# Patient Record
Sex: FEMALE | Race: WHITE | NOT HISPANIC OR LATINO | Employment: OTHER | URBAN - METROPOLITAN AREA
[De-identification: names, ages, dates, MRNs, and addresses within clinical notes are randomized per-mention and may not be internally consistent; named-entity substitution may affect disease eponyms.]

---

## 2023-03-06 LAB
THYROTROPIN (MIU/L) IN SER/PLAS BY DETECTION LIMIT <= 0.05 MIU/L: 1.68 MIU/L (ref 0.44–3.98)
THYROXINE (T4) FREE (NG/DL) IN SER/PLAS: 0.96 NG/DL (ref 0.78–1.48)
TRIIODOTHYRONINE (T3) FREE (PG/ML) IN SER/PLAS: 3.2 PG/ML (ref 2.3–4.2)

## 2023-06-13 LAB
THYROTROPIN (MIU/L) IN SER/PLAS BY DETECTION LIMIT <= 0.05 MIU/L: 1.58 MIU/L (ref 0.44–3.98)
THYROXINE (T4) FREE (NG/DL) IN SER/PLAS: 0.99 NG/DL (ref 0.78–1.48)
TRIIODOTHYRONINE (T3) FREE (PG/ML) IN SER/PLAS: 3.6 PG/ML (ref 2.3–4.2)

## 2023-07-07 PROBLEM — E34.9 ENDOCRINE EXOPHTHALMOS: Status: ACTIVE | Noted: 2023-07-07

## 2023-07-07 PROBLEM — F41.8 SITUATIONAL ANXIETY: Status: ACTIVE | Noted: 2023-07-07

## 2023-07-07 PROBLEM — R92.8 ABNORMAL FINDING ON MAMMOGRAPHY: Status: ACTIVE | Noted: 2023-07-07

## 2023-07-07 PROBLEM — B37.2 CANDIDAL DERMATITIS: Status: ACTIVE | Noted: 2023-07-07

## 2023-07-07 PROBLEM — H52.13 HIGH MYOPIA, BILATERAL: Status: ACTIVE | Noted: 2023-07-07

## 2023-07-07 PROBLEM — H05.20 ENDOCRINE EXOPHTHALMOS: Status: ACTIVE | Noted: 2023-07-07

## 2023-07-07 PROBLEM — R73.09 ABNORMAL BLOOD SUGAR: Status: ACTIVE | Noted: 2023-07-07

## 2023-07-07 PROBLEM — H52.203 ASTIGMATISM OF BOTH EYES: Status: ACTIVE | Noted: 2023-07-07

## 2023-07-07 PROBLEM — R94.6 THYROID FUNCTION TEST ABNORMAL: Status: ACTIVE | Noted: 2023-07-07

## 2023-07-07 PROBLEM — H52.4 BILATERAL PRESBYOPIA: Status: ACTIVE | Noted: 2023-07-07

## 2023-07-07 PROBLEM — E05.90 HYPERTHYROIDISM: Status: ACTIVE | Noted: 2023-07-07

## 2023-07-07 PROBLEM — I10 BENIGN ESSENTIAL HYPERTENSION: Status: ACTIVE | Noted: 2023-07-07

## 2023-07-07 PROBLEM — R06.02 SOB (SHORTNESS OF BREATH): Status: ACTIVE | Noted: 2023-07-07

## 2023-07-07 PROBLEM — H50.22 HYPOTROPIA OF LEFT EYE: Status: ACTIVE | Noted: 2023-07-07

## 2023-07-07 PROBLEM — Z86.69 HISTORY OF MIGRAINE HEADACHES: Status: ACTIVE | Noted: 2023-07-07

## 2023-07-07 PROBLEM — H50.89 RESTRICTIVE STRABISMUS: Status: ACTIVE | Noted: 2023-07-07

## 2023-07-07 PROBLEM — H04.203 BILATERAL EPIPHORA: Status: ACTIVE | Noted: 2023-07-07

## 2023-07-07 PROBLEM — R53.83 FATIGUE: Status: ACTIVE | Noted: 2023-07-07

## 2023-07-07 PROBLEM — E74.39 OTHER DISORDERS OF INTESTINAL CARBOHYDRATE ABSORPTION: Status: ACTIVE | Noted: 2023-07-07

## 2023-07-07 PROBLEM — M85.80 OSTEOPENIA: Status: ACTIVE | Noted: 2023-07-07

## 2023-07-07 PROBLEM — K64.9 HEMORRHOIDS: Status: ACTIVE | Noted: 2023-07-07

## 2023-07-07 PROBLEM — M75.81 ROTATOR CUFF TENDINITIS, RIGHT: Status: ACTIVE | Noted: 2023-07-07

## 2023-07-07 PROBLEM — H53.2 DIPLOPIA: Status: ACTIVE | Noted: 2023-07-07

## 2023-07-07 PROBLEM — E78.5 DYSLIPIDEMIA: Status: ACTIVE | Noted: 2023-07-07

## 2023-07-07 PROBLEM — C44.91 BASAL CELL CARCINOMA: Status: ACTIVE | Noted: 2023-07-07

## 2023-07-07 PROBLEM — H25.10 NUCLEAR SCLEROSIS: Status: ACTIVE | Noted: 2023-07-07

## 2023-07-07 PROBLEM — E05.00 GRAVES DISEASE: Status: ACTIVE | Noted: 2023-07-07

## 2023-07-07 PROBLEM — J30.9 ALLERGIC RHINITIS: Status: ACTIVE | Noted: 2023-07-07

## 2023-07-07 PROBLEM — Z91.013: Status: ACTIVE | Noted: 2023-07-07

## 2023-07-07 RX ORDER — LEVOCETIRIZINE DIHYDROCHLORIDE 5 MG/1
TABLET, FILM COATED ORAL
COMMUNITY
Start: 2023-02-02 | End: 2023-11-27 | Stop reason: SDUPTHER

## 2023-07-07 RX ORDER — LORAZEPAM 0.5 MG/1
TABLET ORAL
COMMUNITY
Start: 2022-07-26 | End: 2023-08-24 | Stop reason: SDUPTHER

## 2023-07-09 DIAGNOSIS — I10 HYPERTENSION, UNSPECIFIED TYPE: ICD-10-CM

## 2023-07-09 DIAGNOSIS — E78.5 HYPERLIPIDEMIA, UNSPECIFIED HYPERLIPIDEMIA TYPE: ICD-10-CM

## 2023-07-10 RX ORDER — ATORVASTATIN CALCIUM 40 MG/1
40 TABLET, FILM COATED ORAL DAILY
Qty: 100 TABLET | Refills: 1 | Status: SHIPPED | OUTPATIENT
Start: 2023-07-10 | End: 2023-11-27 | Stop reason: SDUPTHER

## 2023-07-10 RX ORDER — AMLODIPINE BESYLATE 10 MG/1
10 TABLET ORAL DAILY
Qty: 100 TABLET | Refills: 1 | Status: SHIPPED | OUTPATIENT
Start: 2023-07-10 | End: 2024-02-05

## 2023-08-02 PROBLEM — M79.673 HEEL PAIN: Status: RESOLVED | Noted: 2023-08-02 | Resolved: 2023-08-02

## 2023-08-02 PROBLEM — R07.9 CHEST PAIN: Status: RESOLVED | Noted: 2023-08-02 | Resolved: 2023-08-02

## 2023-08-02 PROBLEM — R05.9 COUGH: Status: RESOLVED | Noted: 2023-08-02 | Resolved: 2023-08-02

## 2023-08-02 PROBLEM — H35.373 EPIRETINAL MEMBRANE (ERM) OF BOTH EYES: Status: ACTIVE | Noted: 2023-08-02

## 2023-08-02 PROBLEM — H25.811 COMBINED FORM OF AGE-RELATED CATARACT, RIGHT EYE: Status: ACTIVE | Noted: 2023-08-02

## 2023-08-02 PROBLEM — M25.511 RIGHT SHOULDER PAIN: Status: RESOLVED | Noted: 2023-08-02 | Resolved: 2023-08-02

## 2023-08-02 PROBLEM — M25.512 LEFT SHOULDER PAIN: Status: RESOLVED | Noted: 2023-08-02 | Resolved: 2023-08-02

## 2023-08-02 PROBLEM — M75.00 FROZEN SHOULDER: Status: RESOLVED | Noted: 2023-08-02 | Resolved: 2023-08-02

## 2023-08-02 PROBLEM — H25.812 COMBINED FORM OF AGE-RELATED CATARACT, LEFT EYE: Status: RESOLVED | Noted: 2023-08-02 | Resolved: 2023-08-02

## 2023-08-02 PROBLEM — R21 GROIN RASH: Status: RESOLVED | Noted: 2023-08-02 | Resolved: 2023-08-02

## 2023-08-03 ENCOUNTER — APPOINTMENT (OUTPATIENT)
Dept: PRIMARY CARE | Facility: CLINIC | Age: 73
End: 2023-08-03
Payer: MEDICARE

## 2023-08-24 ENCOUNTER — OFFICE VISIT (OUTPATIENT)
Dept: PRIMARY CARE | Facility: CLINIC | Age: 73
End: 2023-08-24
Payer: MEDICARE

## 2023-08-24 VITALS
HEIGHT: 64 IN | BODY MASS INDEX: 26.8 KG/M2 | HEART RATE: 78 BPM | WEIGHT: 157 LBS | DIASTOLIC BLOOD PRESSURE: 80 MMHG | SYSTOLIC BLOOD PRESSURE: 144 MMHG

## 2023-08-24 DIAGNOSIS — F41.1 GENERALIZED ANXIETY DISORDER: ICD-10-CM

## 2023-08-24 DIAGNOSIS — Z00.00 ROUTINE GENERAL MEDICAL EXAMINATION AT A HEALTH CARE FACILITY: Primary | ICD-10-CM

## 2023-08-24 DIAGNOSIS — M85.88 OSTEOPENIA OF LUMBAR SPINE: ICD-10-CM

## 2023-08-24 DIAGNOSIS — I10 BENIGN ESSENTIAL HYPERTENSION: ICD-10-CM

## 2023-08-24 DIAGNOSIS — E05.00 GRAVES DISEASE: ICD-10-CM

## 2023-08-24 DIAGNOSIS — E78.5 DYSLIPIDEMIA: ICD-10-CM

## 2023-08-24 DIAGNOSIS — Z51.81 ENCOUNTER FOR THERAPEUTIC DRUG LEVEL MONITORING: ICD-10-CM

## 2023-08-24 LAB
ALANINE AMINOTRANSFERASE (SGPT) (U/L) IN SER/PLAS: 26 U/L (ref 7–45)
ALBUMIN (G/DL) IN SER/PLAS: 4.4 G/DL (ref 3.4–5)
ALKALINE PHOSPHATASE (U/L) IN SER/PLAS: 104 U/L (ref 33–136)
ANION GAP IN SER/PLAS: 16 MMOL/L (ref 10–20)
ASPARTATE AMINOTRANSFERASE (SGOT) (U/L) IN SER/PLAS: 25 U/L (ref 9–39)
BILIRUBIN TOTAL (MG/DL) IN SER/PLAS: 0.4 MG/DL (ref 0–1.2)
CALCIDIOL (25 OH VITAMIN D3) (NG/ML) IN SER/PLAS: 61 NG/ML
CALCIUM (MG/DL) IN SER/PLAS: 9.6 MG/DL (ref 8.6–10.6)
CARBON DIOXIDE, TOTAL (MMOL/L) IN SER/PLAS: 22 MMOL/L (ref 21–32)
CHLORIDE (MMOL/L) IN SER/PLAS: 108 MMOL/L (ref 98–107)
CHOLESTEROL (MG/DL) IN SER/PLAS: 157 MG/DL (ref 0–199)
CHOLESTEROL IN HDL (MG/DL) IN SER/PLAS: 42.7 MG/DL
CHOLESTEROL/HDL RATIO: 3.7
COBALAMIN (VITAMIN B12) (PG/ML) IN SER/PLAS: 391 PG/ML (ref 211–911)
CREATININE (MG/DL) IN SER/PLAS: 0.62 MG/DL (ref 0.5–1.05)
ERYTHROCYTE DISTRIBUTION WIDTH (RATIO) BY AUTOMATED COUNT: 13.5 % (ref 11.5–14.5)
ERYTHROCYTE MEAN CORPUSCULAR HEMOGLOBIN CONCENTRATION (G/DL) BY AUTOMATED: 31.3 G/DL (ref 32–36)
ERYTHROCYTE MEAN CORPUSCULAR VOLUME (FL) BY AUTOMATED COUNT: 95 FL (ref 80–100)
ERYTHROCYTES (10*6/UL) IN BLOOD BY AUTOMATED COUNT: 4.39 X10E12/L (ref 4–5.2)
GFR FEMALE: >90 ML/MIN/1.73M2
GLUCOSE (MG/DL) IN SER/PLAS: 89 MG/DL (ref 74–99)
HEMATOCRIT (%) IN BLOOD BY AUTOMATED COUNT: 41.8 % (ref 36–46)
HEMOGLOBIN (G/DL) IN BLOOD: 13.1 G/DL (ref 12–16)
LDL: 61 MG/DL (ref 0–99)
LEUKOCYTES (10*3/UL) IN BLOOD BY AUTOMATED COUNT: 10.6 X10E9/L (ref 4.4–11.3)
NON HDL CHOLESTEROL: 114 MG/DL
NRBC (PER 100 WBCS) BY AUTOMATED COUNT: 0 /100 WBC (ref 0–0)
PLATELETS (10*3/UL) IN BLOOD AUTOMATED COUNT: 347 X10E9/L (ref 150–450)
POTASSIUM (MMOL/L) IN SER/PLAS: 4 MMOL/L (ref 3.5–5.3)
PROTEIN TOTAL: 7.5 G/DL (ref 6.4–8.2)
SODIUM (MMOL/L) IN SER/PLAS: 142 MMOL/L (ref 136–145)
TRIGLYCERIDE (MG/DL) IN SER/PLAS: 269 MG/DL (ref 0–149)
UREA NITROGEN (MG/DL) IN SER/PLAS: 20 MG/DL (ref 6–23)
VLDL: 54 MG/DL (ref 0–40)

## 2023-08-24 PROCEDURE — 80061 LIPID PANEL: CPT

## 2023-08-24 PROCEDURE — 80373 DRUG SCREENING TRAMADOL: CPT

## 2023-08-24 PROCEDURE — 99214 OFFICE O/P EST MOD 30 MIN: CPT | Performed by: FAMILY MEDICINE

## 2023-08-24 PROCEDURE — 80354 DRUG SCREENING FENTANYL: CPT

## 2023-08-24 PROCEDURE — 82306 VITAMIN D 25 HYDROXY: CPT

## 2023-08-24 PROCEDURE — 80368 SEDATIVE HYPNOTICS: CPT

## 2023-08-24 PROCEDURE — 80358 DRUG SCREENING METHADONE: CPT

## 2023-08-24 PROCEDURE — 80053 COMPREHEN METABOLIC PANEL: CPT

## 2023-08-24 PROCEDURE — 1159F MED LIST DOCD IN RCRD: CPT | Performed by: FAMILY MEDICINE

## 2023-08-24 PROCEDURE — 80346 BENZODIAZEPINES1-12: CPT

## 2023-08-24 PROCEDURE — 99397 PER PM REEVAL EST PAT 65+ YR: CPT | Performed by: FAMILY MEDICINE

## 2023-08-24 PROCEDURE — 3079F DIAST BP 80-89 MM HG: CPT | Performed by: FAMILY MEDICINE

## 2023-08-24 PROCEDURE — 80307 DRUG TEST PRSMV CHEM ANLYZR: CPT

## 2023-08-24 PROCEDURE — 85027 COMPLETE CBC AUTOMATED: CPT

## 2023-08-24 PROCEDURE — 3077F SYST BP >= 140 MM HG: CPT | Performed by: FAMILY MEDICINE

## 2023-08-24 PROCEDURE — 80361 OPIATES 1 OR MORE: CPT

## 2023-08-24 PROCEDURE — 1036F TOBACCO NON-USER: CPT | Performed by: FAMILY MEDICINE

## 2023-08-24 PROCEDURE — 80365 DRUG SCREENING OXYCODONE: CPT

## 2023-08-24 PROCEDURE — 82607 VITAMIN B-12: CPT

## 2023-08-24 RX ORDER — PNEUMOCOCCAL 20-VALENT CONJUGATE VACCINE 2.2; 2.2; 2.2; 2.2; 2.2; 2.2; 2.2; 2.2; 2.2; 2.2; 2.2; 2.2; 2.2; 2.2; 2.2; 2.2; 4.4; 2.2; 2.2; 2.2 UG/.5ML; UG/.5ML; UG/.5ML; UG/.5ML; UG/.5ML; UG/.5ML; UG/.5ML; UG/.5ML; UG/.5ML; UG/.5ML; UG/.5ML; UG/.5ML; UG/.5ML; UG/.5ML; UG/.5ML; UG/.5ML; UG/.5ML; UG/.5ML; UG/.5ML; UG/.5ML
0.5 INJECTION, SUSPENSION INTRAMUSCULAR ONCE
Qty: 0.5 ML | Refills: 0 | Status: SHIPPED | OUTPATIENT
Start: 2023-08-24 | End: 2023-08-24

## 2023-08-24 RX ORDER — LORAZEPAM 0.5 MG/1
0.5 TABLET ORAL EVERY 6 HOURS PRN
Qty: 28 TABLET | Refills: 0 | Status: SHIPPED | OUTPATIENT
Start: 2023-08-24 | End: 2023-08-31

## 2023-08-24 ASSESSMENT — PATIENT HEALTH QUESTIONNAIRE - PHQ9
SUM OF ALL RESPONSES TO PHQ9 QUESTIONS 1 AND 2: 0
1. LITTLE INTEREST OR PLEASURE IN DOING THINGS: NOT AT ALL
2. FEELING DOWN, DEPRESSED OR HOPELESS: NOT AT ALL

## 2023-08-26 PROBLEM — Z00.00 ROUTINE GENERAL MEDICAL EXAMINATION AT A HEALTH CARE FACILITY: Status: ACTIVE | Noted: 2023-08-26

## 2023-08-26 PROBLEM — Z51.81 ENCOUNTER FOR THERAPEUTIC DRUG LEVEL MONITORING: Status: ACTIVE | Noted: 2023-08-26

## 2023-08-26 PROBLEM — F41.1 GENERALIZED ANXIETY DISORDER: Status: ACTIVE | Noted: 2023-08-26

## 2023-08-26 NOTE — PROGRESS NOTES
Will be done  Subjective     Patient ID: Jaz Marino is a 73 y.o. female who presents for Follow-up, Hip Pain (Left side), Toe Pain, Neck Pain, and Med Management.      Pt's PMH, PSH, SH, FH , meds and allergies was obtained / reviewed and updated .     Dental visits : Y  Vision issues : N  Hearing issues : N    Immunizations : Y    Diet :  could be better  Exercise:  Weight concerns :     Alcohol: as noted in SH  Tobacco: as noted in SH  Recreational drug use : None/ as noted in SH    Sexually active : Active   Contraception :   Menstrual problems:  Premenopausal/perimenopausal/ postmenopausal:    G:  Parity:  Full term:    Premature:   (s):   Living :  Ab induced:   Ab spontaneous :  Ectopic :   Multiple :    PAP smear :  Mammogram :  Colonoscopy:    Metabolic screening   - Lipid   - Glucose  I have personally reviewed the OAR[place] report for patient I have considered the risks of abuse, dependence, addiction and diversion.   Is the patient prescribed a combination of a benzodiazepine and opioid? No.   Last urine drug screening date/ordered   Results of last screen: Results as expected.   Controlled Substance Agreement:   I have printed this form and reviewed each line item with the patient and the patient has verbalized understanding.   Date of the last Controlled Substance Agreement:   BENZODIAZEPINES   What is the patient's goal of therapy? anxiety.  Is this being achieved with current treatment? anxiety.   REY-7   1. Feeling nervous, anxious or on edge- more than half the days  2. Not being able to stop or control worrying - more than half the days  3. Worrying too much about different things - more than half the days  4. Trouble relaxing - more than half the days  5. Being so restless that it is hard to sit still - more than half the days  6. Becoming easily annoyed or irritable - more than half the days  7. Feeling afraid as if something awful might happen - more than half the days  Total Score  "= 14  Activities of Daily Living:   Yes, it is my opinion that this patient is benefitting from benzodiazepine therapy.   Physical functioning: Better   Family relationships: Better   Social relationships: Better   Mood: Better   Sleep patterns: Better   Overall functioning: Better   Referrals or Alternatives: None, Psychiatry/Psychotherapy/ Behavioral Health: .   Current or Past Use of Non-Controlled Medication: Serotonin Reuptake Inhibitor (SSRI).   Patient is here for follow-up  Anxiety only when she is traveling  Has pain over the left iliac crest that is episodic  Blood pressure well controlled      ======================================================    Visit Vitals  Blood Pressure 144/80   Pulse 78   Height 1.626 m (5' 4\")   Weight 71.2 kg (157 lb)   Body Mass Index 26.95 kg/m²   Smoking Status Never   Body Surface Area 1.79 m²      No LMP recorded.     =====================================    Review of systems:  Constitutional: no chills, no fever and no night sweats.     Eyes: no blurred vision and no eyesight problems.     ENT: no hearing loss, no nasal congestion, no nasal discharge, no hoarseness and no sore throat.     Cardiovascular: no chest pain, no intermittent leg claudication, no lower extremity edema, no palpitations and no syncope.     Respiratory: no cough, no shortness of breath during exertion, no shortness of breath at rest and no wheezing.     Gastrointestinal: no abdominal pain, no blood in stools, no constipation, no diarrhea, no melena, no nausea, no rectal pain and no vomiting.     Genitourinary: no dysuria, no change in urinary frequency, no urinary hesitancy, no feelings of urinary urgency and no vaginal discharge.     Musculoskeletal: no arthralgias, no back pain and no myalgias.     Integumentary: no new skin lesions and no rashes.     Neurological: no difficulty walking, no headache, no limb weakness, no numbness and no tingling.     Psychiatric: no anxiety, no depression, no " anhedonia and no substance use disorders.   ============================================================    Physical exam :    Constitutional: Alert and in no acute distress. Well developed, well nourished.     Eyes: Normal external exam. Pupils were equal in size, round, reactive to light (PERRL) with normal accommodation and extraocular movements intact (EOMI).     Ears, Nose, Mouth, and Throat: External inspection of ears and nose: Normal.  Otoscopic examination: Normal.      Neck: No neck mass was observed. Supple.     Cardiovascular: Heart rate and rhythm were normal, normal S1 and S2, no gallops, no murmurs and no pericardial rub    Pulmonary: No respiratory distress. Clear bilateral breath sounds.     Abdomen: Soft nontender; no abdominal mass palpated. No organomegaly.     Musculoskeletal: No joint swelling seen, normal movements of all extremities. Range of motion: Normal.  Muscle strength/tone: Normal.      Skin: Normal skin color and pigmentation, normal skin turgor, and no rash.     Neurologic: Deep tendon reflexes were 2+ and symmetric. Sensation: Normal.     Psychiatric: Judgment and insight: Intact. Mood and affect: Normal.    Lymphatic : Cervical/ axillary/ groin Lns Palpable/ non palpable     Endocrine: no recent weight gain and no recent weight loss.     Hematologic/Lymphatic: no tendency for easy bruising and no swollen glands.          All other systems have been reviewed and are negative for complaint.      Assessment/Plan    Problem List Items Addressed This Visit       Benign essential hypertension    Dyslipidemia    Relevant Orders    Lipid Panel (Completed)    Comprehensive Metabolic Panel (Completed)    CBC (Completed)    Vitamin B12 (Completed)    Graves disease    Relevant Orders    CBC (Completed)    Vitamin B12 (Completed)    Osteopenia    Relevant Orders    Vitamin D, Total (Completed)    CBC (Completed)    Vitamin B12 (Completed)    Routine general medical examination at a health [facility]  facility - Primary    Encounter for therapeutic drug level monitoring    Relevant Orders    Opiate/Opioid/Benzo Extended Prescription Compliance    Generalized anxiety disorder    Relevant Medications    LORazepam (Ativan) 0.5 mg tablet    Other Relevant Orders    Opiate/Opioid/Benzo Extended Prescription Compliance

## 2023-08-30 LAB
6-ACETYLMORPHINE: <25 NG/ML
7-AMINOCLONAZEPAM: <25 NG/ML
ALPHA-HYDROXYALPRAZOLAM: <25 NG/ML
ALPHA-HYDROXYMIDAZOLAM: <25 NG/ML
ALPRAZOLAM: <25 NG/ML
AMPHETAMINE (PRESENCE) IN URINE BY SCREEN METHOD: NORMAL
BARBITURATES PRESENCE IN URINE BY SCREEN METHOD: NORMAL
CANNABINOIDS IN URINE BY SCREEN METHOD: NORMAL
CHLORDIAZEPOXIDE: <25 NG/ML
CLONAZEPAM: <25 NG/ML
COCAINE (PRESENCE) IN URINE BY SCREEN METHOD: NORMAL
CODEINE: <50 NG/ML
CREATINE, URINE FOR DRUG: 112.9 MG/DL
DIAZEPAM: <25 NG/ML
DRUG SCREEN COMMENT URINE: NORMAL
EDDP: <25 NG/ML
FENTANYL CONFIRMATION, URINE: <2.5 NG/ML
HYDROCODONE: <25 NG/ML
HYDROMORPHONE: <25 NG/ML
LORAZEPAM: <25 NG/ML
METHADONE CONFIRMATION,URINE: <25 NG/ML
MIDAZOLAM: <25 NG/ML
MORPHINE URINE: <50 NG/ML
NORDIAZEPAM: <25 NG/ML
NORFENTANYL: <2.5 NG/ML
NORHYDROCODONE: <25 NG/ML
NOROXYCODONE: <25 NG/ML
O-DESMETHYLTRAMADOL: <50 NG/ML
OXAZEPAM: <25 NG/ML
OXYCODONE: <25 NG/ML
OXYMORPHONE: <25 NG/ML
PHENCYCLIDINE (PRESENCE) IN URINE BY SCREEN METHOD: NORMAL
TEMAZEPAM: <25 NG/ML
TRAMADOL: <50 NG/ML
ZOLPIDEM METABOLITE (ZCA): <25 NG/ML
ZOLPIDEM: <25 NG/ML

## 2023-10-15 DIAGNOSIS — I10 PRIMARY HYPERTENSION: ICD-10-CM

## 2023-10-16 RX ORDER — LOSARTAN POTASSIUM AND HYDROCHLOROTHIAZIDE 25; 100 MG/1; MG/1
1 TABLET ORAL DAILY
Qty: 100 TABLET | Refills: 1 | Status: SHIPPED | OUTPATIENT
Start: 2023-10-16 | End: 2023-11-27 | Stop reason: SDUPTHER

## 2023-11-10 ENCOUNTER — OFFICE VISIT (OUTPATIENT)
Dept: PRIMARY CARE | Facility: CLINIC | Age: 73
End: 2023-11-10
Payer: MEDICARE

## 2023-11-10 VITALS
HEART RATE: 81 BPM | SYSTOLIC BLOOD PRESSURE: 120 MMHG | DIASTOLIC BLOOD PRESSURE: 69 MMHG | HEIGHT: 64 IN | BODY MASS INDEX: 26.46 KG/M2 | WEIGHT: 155 LBS

## 2023-11-10 DIAGNOSIS — Z12.31 VISIT FOR SCREENING MAMMOGRAM: ICD-10-CM

## 2023-11-10 DIAGNOSIS — H92.01 EAR PAIN, RIGHT: Primary | ICD-10-CM

## 2023-11-10 DIAGNOSIS — E05.00 GRAVES DISEASE: ICD-10-CM

## 2023-11-10 PROBLEM — H35.342 LAMELLAR MACULAR HOLE OF LEFT EYE: Status: ACTIVE | Noted: 2023-11-10

## 2023-11-10 PROCEDURE — 99214 OFFICE O/P EST MOD 30 MIN: CPT | Performed by: FAMILY MEDICINE

## 2023-11-10 PROCEDURE — 1159F MED LIST DOCD IN RCRD: CPT | Performed by: FAMILY MEDICINE

## 2023-11-10 PROCEDURE — 1036F TOBACCO NON-USER: CPT | Performed by: FAMILY MEDICINE

## 2023-11-10 PROCEDURE — 3074F SYST BP LT 130 MM HG: CPT | Performed by: FAMILY MEDICINE

## 2023-11-10 PROCEDURE — 3078F DIAST BP <80 MM HG: CPT | Performed by: FAMILY MEDICINE

## 2023-11-10 RX ORDER — AMOXICILLIN AND CLAVULANATE POTASSIUM 875; 125 MG/1; MG/1
875 TABLET, FILM COATED ORAL 2 TIMES DAILY
Qty: 14 TABLET | Refills: 0 | Status: SHIPPED | OUTPATIENT
Start: 2023-11-10 | End: 2023-11-17

## 2023-11-10 ASSESSMENT — ENCOUNTER SYMPTOMS
OCCASIONAL FEELINGS OF UNSTEADINESS: 0
DEPRESSION: 0
LOSS OF SENSATION IN FEET: 0

## 2023-11-11 NOTE — PROGRESS NOTES
1. Continue lamictal from 50mg daily.  2. Pomodoro technique  3.   These more severe symptoms can include:    fever  joint pain  muscle pain  general discomfort  swelling of the lymph nodes around the neck  high count of eosinophils (a type of immune cell) in the blood    Follow up in 2 weeks   Chief Complaint/HPI:  Patient is here for acute visit has been having pain in the ear had dental work done last week denies any fever has had sinus congestion has been taking antihistamine denies cough  Needs mammogram order    ROS otherwise negative aside from what was mentioned above in HPI.      Patient Active Problem List   Diagnosis    Abnormal blood sugar    Abnormal finding on mammography    Allergic rhinitis    Allergy to clam    Astigmatism of both eyes    Basal cell carcinoma    Benign essential hypertension    Bilateral epiphora    Bilateral presbyopia    Candidal dermatitis    Diplopia    Dyslipidemia    Graves disease    Endocrine exophthalmos    Fatigue    Hemorrhoids    High myopia, bilateral    History of migraine headaches    Hyperthyroidism    Hypotropia of left eye    Nuclear sclerosis    Osteopenia    Other disorders of intestinal carbohydrate absorption    Restrictive strabismus    Rotator cuff tendinitis, right    Situational anxiety    SOB (shortness of breath)    Thyroid function test abnormal    Combined form of age-related cataract, right eye    Epiretinal membrane (ERM) of both eyes    Routine general medical examination at a health care facility    Encounter for therapeutic drug level monitoring    Generalized anxiety disorder    Lamellar macular hole of left eye    Visit for screening mammogram    Ear pain, right     Past Medical History:   Diagnosis Date    Acute gastric ulcer with hemorrhage     Acute gastric ulcer with hemorrhage    Chest pain 08/02/2023    Cough 08/02/2023    Frozen shoulder 08/02/2023    Left shoulder pain 08/02/2023    Other conditions influencing health status     Skin Cancer     Past Surgical History:   Procedure Laterality Date    HEMORRHOID SURGERY  04/20/2013    Hemorrhoidectomy    SKIN CANCER EXCISION  04/20/2013    Mohs Micrographic Surgery Face     Family History   Problem Relation Name Age of Onset    Other (dyslipidemia) Mother      Hypertension Mother       Other (dyslipidemia) Father      Stroke Father      Skin cancer Other       Social History     Tobacco Use    Smoking status: Never    Smokeless tobacco: Never   Substance Use Topics    Alcohol use: Never    Drug use: Never         ALLERGIES  Allergies   Allergen Reactions    Codeine Other    Pneumococcal Vaccine Other    Propoxyphene N-Acetaminophen Other         MEDICATIONS  Current Outpatient Medications   Medication Sig Dispense Refill    amLODIPine (Norvasc) 10 mg tablet Take 1 tablet (10 mg) by mouth once daily. 100 tablet 1    amoxicillin-pot clavulanate (Augmentin) 875-125 mg tablet Take 1 tablet (875 mg) by mouth 2 times a day for 7 days. 14 tablet 0    atorvastatin (Lipitor) 40 mg tablet Take 1 tablet (40 mg) by mouth once daily. 100 tablet 1    levocetirizine (Xyzal) 5 mg tablet TAKE 1 TABLET DAILY IN THE EVENING.      LORazepam (Ativan) 0.5 mg tablet Take 1 tablet (0.5 mg) by mouth every 6 hours if needed for anxiety for up to 7 days. 28 tablet 0    losartan-hydrochlorothiazide (Hyzaar) 100-25 mg tablet TAKE 1 TABLET BY MOUTH ONCE  DAILY 100 tablet 1     No current facility-administered medications for this visit.         PHYSICAL EXAM  Visit Vitals  Blood Pressure 120/69   Pulse 81     .FLOWAMB[11   .FLOWAMB[14   Body mass index is 26.61 kg/m².  Gen: Alert, NAD  HEENT:  PERRLA, EOMI, conjunctiva and sclera normal in appearance  Respiratory:  Lungs CTAB  Cardiovascular:  Heart RRR. No M/R/G  Neuro:  Gross motor and sensory intact  Skin:  No suspicious lesions present    ASSESSMENT/PLAN  Problem List Items Addressed This Visit       Graves disease    Visit for screening mammogram    Relevant Orders    BI mammo bilateral screening tomosynthesis    Ear pain, right - Primary    Relevant Medications    amoxicillin-pot clavulanate (Augmentin) 875-125 mg tablet           Edy Whatley MD

## 2023-11-27 ENCOUNTER — LAB (OUTPATIENT)
Dept: LAB | Facility: LAB | Age: 73
End: 2023-11-27
Payer: MEDICARE

## 2023-11-27 DIAGNOSIS — E78.5 HYPERLIPIDEMIA, UNSPECIFIED HYPERLIPIDEMIA TYPE: ICD-10-CM

## 2023-11-27 DIAGNOSIS — E05.00 THYROTOXICOSIS WITH DIFFUSE GOITER WITHOUT THYROTOXIC CRISIS OR STORM: Primary | ICD-10-CM

## 2023-11-27 DIAGNOSIS — I10 PRIMARY HYPERTENSION: ICD-10-CM

## 2023-11-27 DIAGNOSIS — E05.00 THYROTOXICOSIS WITH DIFFUSE GOITER WITHOUT THYROTOXIC CRISIS OR STORM: ICD-10-CM

## 2023-11-27 DIAGNOSIS — J30.9 ALLERGIC RHINITIS, UNSPECIFIED SEASONALITY, UNSPECIFIED TRIGGER: ICD-10-CM

## 2023-11-27 LAB
T3FREE SERPL-MCNC: 3.5 PG/ML (ref 2.3–4.2)
T4 FREE SERPL-MCNC: 0.93 NG/DL (ref 0.78–1.48)
TSH SERPL-ACNC: 1.38 MIU/L (ref 0.44–3.98)

## 2023-11-27 PROCEDURE — 36415 COLL VENOUS BLD VENIPUNCTURE: CPT

## 2023-11-27 PROCEDURE — 84481 FREE ASSAY (FT-3): CPT

## 2023-11-27 PROCEDURE — 84439 ASSAY OF FREE THYROXINE: CPT

## 2023-11-27 PROCEDURE — 84443 ASSAY THYROID STIM HORMONE: CPT

## 2023-11-27 RX ORDER — LOSARTAN POTASSIUM AND HYDROCHLOROTHIAZIDE 25; 100 MG/1; MG/1
1 TABLET ORAL DAILY
Qty: 100 TABLET | Refills: 1 | Status: SHIPPED | OUTPATIENT
Start: 2023-11-27 | End: 2024-02-29 | Stop reason: SDUPTHER

## 2023-11-27 RX ORDER — LEVOCETIRIZINE DIHYDROCHLORIDE 5 MG/1
5 TABLET, FILM COATED ORAL EVERY EVENING
Qty: 90 TABLET | Refills: 1 | Status: SHIPPED | OUTPATIENT
Start: 2023-11-27

## 2023-11-27 RX ORDER — ATORVASTATIN CALCIUM 40 MG/1
40 TABLET, FILM COATED ORAL DAILY
Qty: 100 TABLET | Refills: 1 | Status: SHIPPED | OUTPATIENT
Start: 2023-11-27 | End: 2024-05-01

## 2023-11-28 ENCOUNTER — TELEPHONE (OUTPATIENT)
Dept: PRIMARY CARE | Facility: CLINIC | Age: 73
End: 2023-11-28
Payer: MEDICARE

## 2023-12-01 ENCOUNTER — OFFICE VISIT (OUTPATIENT)
Dept: ENDOCRINOLOGY | Facility: CLINIC | Age: 73
End: 2023-12-01
Payer: MEDICARE

## 2023-12-01 VITALS
WEIGHT: 154.4 LBS | DIASTOLIC BLOOD PRESSURE: 70 MMHG | RESPIRATION RATE: 16 BRPM | HEART RATE: 76 BPM | HEIGHT: 64 IN | SYSTOLIC BLOOD PRESSURE: 120 MMHG | BODY MASS INDEX: 26.36 KG/M2

## 2023-12-01 DIAGNOSIS — E05.00 GRAVES DISEASE: Primary | ICD-10-CM

## 2023-12-01 PROCEDURE — 1036F TOBACCO NON-USER: CPT | Performed by: INTERNAL MEDICINE

## 2023-12-01 PROCEDURE — 3074F SYST BP LT 130 MM HG: CPT | Performed by: INTERNAL MEDICINE

## 2023-12-01 PROCEDURE — 1159F MED LIST DOCD IN RCRD: CPT | Performed by: INTERNAL MEDICINE

## 2023-12-01 PROCEDURE — 3078F DIAST BP <80 MM HG: CPT | Performed by: INTERNAL MEDICINE

## 2023-12-01 PROCEDURE — 99213 OFFICE O/P EST LOW 20 MIN: CPT | Performed by: INTERNAL MEDICINE

## 2023-12-01 ASSESSMENT — ENCOUNTER SYMPTOMS
COUGH: 0
FATIGUE: 0
CHILLS: 0
HEADACHES: 0
PALPITATIONS: 0
NAUSEA: 0
VOMITING: 0
DIARRHEA: 0
SHORTNESS OF BREATH: 0
FEVER: 0

## 2023-12-01 NOTE — PROGRESS NOTES
"Subjective   Patient ID: Jaz Marino is a 73 y.o. female who presents for Graves' Disease and Hyperthyroidism.  HPI  Since last visit has continued to do well of methimazole  On it from 6325-1747 for graves with active eye dz.   Eye dz resolved and now off methimazole for a year.   Still dealing with other eye issues and may need cataract procedure.      Review of Systems   Constitutional:  Negative for chills, fatigue and fever.   Respiratory:  Negative for cough and shortness of breath.    Cardiovascular:  Negative for chest pain and palpitations.   Gastrointestinal:  Negative for diarrhea, nausea and vomiting.   Neurological:  Negative for headaches.       Objective       12/1/2023 11:40 AM    /70   Pulse 76   Resp 16   Weight 70 kg (154 lb 6.4 oz)   Height 1.626 m (5' 4\")       Physical Exam  Constitutional:       Appearance: Normal appearance. She is overweight.   HENT:      Head: Normocephalic and atraumatic.   Neck:      Thyroid: No thyroid mass, thyromegaly or thyroid tenderness.   Cardiovascular:      Rate and Rhythm: Normal rate and regular rhythm.      Heart sounds: No murmur heard.     No gallop.   Pulmonary:      Effort: Pulmonary effort is normal.      Breath sounds: Normal breath sounds.   Abdominal:      Palpations: Abdomen is soft.      Comments: benign   Neurological:      General: No focal deficit present.      Mental Status: She is alert and oriented to person, place, and time.      Deep Tendon Reflexes: Reflexes are normal and symmetric.   Psychiatric:         Behavior: Behavior is cooperative.         Assessment/Plan   Problem List Items Addressed This Visit             ICD-10-CM    Graves disease - Primary E05.00     Discussed her course.   Discussed recurrence after a year off medication happens but is fairly rare.  Will follow up in 9 months.  Encouraged her to call or message with concerns     "

## 2024-02-04 DIAGNOSIS — I10 HYPERTENSION, UNSPECIFIED TYPE: ICD-10-CM

## 2024-02-05 RX ORDER — AMLODIPINE BESYLATE 10 MG/1
10 TABLET ORAL DAILY
Qty: 100 TABLET | Refills: 1 | Status: SHIPPED | OUTPATIENT
Start: 2024-02-05

## 2024-02-29 ENCOUNTER — OFFICE VISIT (OUTPATIENT)
Dept: PRIMARY CARE | Facility: CLINIC | Age: 74
End: 2024-02-29
Payer: MEDICARE

## 2024-02-29 VITALS
HEART RATE: 65 BPM | DIASTOLIC BLOOD PRESSURE: 71 MMHG | BODY MASS INDEX: 26.8 KG/M2 | WEIGHT: 157 LBS | SYSTOLIC BLOOD PRESSURE: 125 MMHG | HEIGHT: 64 IN

## 2024-02-29 DIAGNOSIS — I10 PRIMARY HYPERTENSION: ICD-10-CM

## 2024-02-29 DIAGNOSIS — E05.00 GRAVES DISEASE: ICD-10-CM

## 2024-02-29 DIAGNOSIS — Z00.00 ROUTINE GENERAL MEDICAL EXAMINATION AT HEALTH CARE FACILITY: Primary | ICD-10-CM

## 2024-02-29 DIAGNOSIS — E78.5 DYSLIPIDEMIA: ICD-10-CM

## 2024-02-29 DIAGNOSIS — I10 BENIGN ESSENTIAL HYPERTENSION: ICD-10-CM

## 2024-02-29 DIAGNOSIS — C44.319 BASAL CELL CARCINOMA (BCC) OF SKIN OF OTHER PART OF FACE: ICD-10-CM

## 2024-02-29 PROCEDURE — 3078F DIAST BP <80 MM HG: CPT | Performed by: FAMILY MEDICINE

## 2024-02-29 PROCEDURE — 1036F TOBACCO NON-USER: CPT | Performed by: FAMILY MEDICINE

## 2024-02-29 PROCEDURE — 99214 OFFICE O/P EST MOD 30 MIN: CPT | Performed by: FAMILY MEDICINE

## 2024-02-29 PROCEDURE — 1159F MED LIST DOCD IN RCRD: CPT | Performed by: FAMILY MEDICINE

## 2024-02-29 PROCEDURE — 1124F ACP DISCUSS-NO DSCNMKR DOCD: CPT | Performed by: FAMILY MEDICINE

## 2024-02-29 PROCEDURE — 3074F SYST BP LT 130 MM HG: CPT | Performed by: FAMILY MEDICINE

## 2024-02-29 PROCEDURE — 1170F FXNL STATUS ASSESSED: CPT | Performed by: FAMILY MEDICINE

## 2024-02-29 PROCEDURE — 1160F RVW MEDS BY RX/DR IN RCRD: CPT | Performed by: FAMILY MEDICINE

## 2024-02-29 PROCEDURE — G0444 DEPRESSION SCREEN ANNUAL: HCPCS | Performed by: FAMILY MEDICINE

## 2024-02-29 PROCEDURE — G0439 PPPS, SUBSEQ VISIT: HCPCS | Performed by: FAMILY MEDICINE

## 2024-02-29 RX ORDER — LOSARTAN POTASSIUM AND HYDROCHLOROTHIAZIDE 25; 100 MG/1; MG/1
1 TABLET ORAL DAILY
Qty: 90 TABLET | Refills: 1 | Status: SHIPPED | OUTPATIENT
Start: 2024-02-29 | End: 2024-05-08

## 2024-02-29 ASSESSMENT — ACTIVITIES OF DAILY LIVING (ADL)
TAKING_MEDICATION: INDEPENDENT
DOING_HOUSEWORK: INDEPENDENT
MANAGING_FINANCES: INDEPENDENT
GROCERY_SHOPPING: INDEPENDENT
BATHING: INDEPENDENT
DRESSING: INDEPENDENT

## 2024-02-29 ASSESSMENT — PATIENT HEALTH QUESTIONNAIRE - PHQ9
2. FEELING DOWN, DEPRESSED OR HOPELESS: NOT AT ALL
SUM OF ALL RESPONSES TO PHQ9 QUESTIONS 1 AND 2: 0
1. LITTLE INTEREST OR PLEASURE IN DOING THINGS: NOT AT ALL

## 2024-02-29 ASSESSMENT — ENCOUNTER SYMPTOMS
LOSS OF SENSATION IN FEET: 0
DEPRESSION: 0
OCCASIONAL FEELINGS OF UNSTEADINESS: 0

## 2024-02-29 NOTE — PROGRESS NOTES
"Subjective   Reason for Visit: Jaz Marino is an 73 y.o. female here for a Medicare Wellness visit.     Past Medical, Surgical, and Family History reviewed and updated in chart.         HPI  HTN bp controlled  HLD taking and bethany statin  Hyperthyroid stable sees endo  Lost brother in September has been grieving    Patient Care Team:  Edy Whatley MD as PCP - General     Review of Systems   Constitutional:  Negative for chills and fever.   HENT: Negative.     Eyes:  Positive for visual disturbance.   Respiratory: Negative.     Cardiovascular: Negative.    Gastrointestinal: Negative.  Negative for nausea and vomiting.   Endocrine: Negative.    Genitourinary: Negative.    Musculoskeletal: Negative.    Skin: Negative.  Negative for rash.   Allergic/Immunologic: Negative.    Neurological: Negative.    Hematological: Negative.    Psychiatric/Behavioral: Negative.     All other systems reviewed and are negative.      Objective   Vitals:  Blood Pressure 125/71   Pulse 65   Height 1.626 m (5' 4\")   Weight 71.2 kg (157 lb)   Body Mass Index 26.95 kg/m²       Physical Exam  Vitals and nursing note reviewed.   Constitutional:       General: She is not in acute distress.     Appearance: Normal appearance.   HENT:      Head: Normocephalic and atraumatic.      Right Ear: Tympanic membrane normal.      Left Ear: Tympanic membrane normal.      Nose: Nose normal.      Mouth/Throat:      Mouth: Mucous membranes are moist.   Eyes:      Extraocular Movements: Extraocular movements intact.      Pupils: Pupils are equal, round, and reactive to light.   Cardiovascular:      Rate and Rhythm: Normal rate and regular rhythm.      Pulses: Normal pulses.      Heart sounds: Normal heart sounds.   Pulmonary:      Effort: Pulmonary effort is normal.      Breath sounds: Normal breath sounds.   Abdominal:      General: Abdomen is flat. Bowel sounds are normal.      Palpations: Abdomen is soft.   Musculoskeletal:         General: Normal " "range of motion.      Cervical back: Normal range of motion and neck supple. No rigidity.   Skin:     General: Skin is warm and dry.      Capillary Refill: Capillary refill takes less than 2 seconds.      Findings: No rash.   Neurological:      General: No focal deficit present.      Mental Status: She is alert and oriented to person, place, and time.   Psychiatric:         Mood and Affect: Mood normal.         Thought Content: Thought content normal.         Assessment/Plan   Problem List Items Addressed This Visit       Basal cell carcinoma    Current Assessment & Plan     S/p excision         Benign essential hypertension    Current Assessment & Plan     Discussed modifiable ( diet , exercise, weight ) and non modifiable ( Age, family history) risk factors HTN .  Encouraged to stay physically active , healthy eating habits - limit red meat intake, more of plant based diet, freshly prepared foods etc.,    Adverse effects of untreated hypertension discussed which include CAD/MI, CVA ,  CKD etc.,           Dyslipidemia    Current Assessment & Plan     Check lipid panel continue medications continue healthy eating work out  150 minutes a week          Relevant Orders    Comprehensive Metabolic Panel    Lipid Panel    Graves disease    Current Assessment & Plan     Has been off meds. Sees ENDO.         Routine general medical examination at health care facility - Primary    Primary hypertension    Relevant Medications    losartan-hydrochlorothiazide (Hyzaar) 100-25 mg tablet    spent 15 minutes obtaining and discussing depression screening using PHQ-2 questions with results documented in chart.”  (If screen positive: \"The screen indicated potential depression and PHQ-9 was obtained with treatment and referral plan discussed         "

## 2024-03-03 PROBLEM — I10 PRIMARY HYPERTENSION: Status: ACTIVE | Noted: 2024-03-03

## 2024-03-03 PROBLEM — Z00.00 ROUTINE GENERAL MEDICAL EXAMINATION AT HEALTH CARE FACILITY: Status: ACTIVE | Noted: 2024-03-03

## 2024-03-03 ASSESSMENT — ENCOUNTER SYMPTOMS
CARDIOVASCULAR NEGATIVE: 1
VOMITING: 0
MUSCULOSKELETAL NEGATIVE: 1
CHILLS: 0
ENDOCRINE NEGATIVE: 1
PSYCHIATRIC NEGATIVE: 1
NAUSEA: 0
FEVER: 0
NEUROLOGICAL NEGATIVE: 1
HEMATOLOGIC/LYMPHATIC NEGATIVE: 1
RESPIRATORY NEGATIVE: 1
GASTROINTESTINAL NEGATIVE: 1
ALLERGIC/IMMUNOLOGIC NEGATIVE: 1

## 2024-03-03 NOTE — ASSESSMENT & PLAN NOTE
Discussed modifiable ( diet , exercise, weight ) and non modifiable ( Age, family history) risk factors HTN .  Encouraged to stay physically active , healthy eating habits - limit red meat intake, more of plant based diet, freshly prepared foods etc.,    Adverse effects of untreated hypertension discussed which include CAD/MI, CVA ,  CKD etc.,

## 2024-03-07 ENCOUNTER — LAB (OUTPATIENT)
Dept: LAB | Facility: LAB | Age: 74
End: 2024-03-07
Payer: MEDICARE

## 2024-03-07 DIAGNOSIS — E78.5 DYSLIPIDEMIA: ICD-10-CM

## 2024-03-07 LAB
ALBUMIN SERPL BCP-MCNC: 4.3 G/DL (ref 3.4–5)
ALP SERPL-CCNC: 88 U/L (ref 33–136)
ALT SERPL W P-5'-P-CCNC: 23 U/L (ref 7–45)
ANION GAP SERPL CALC-SCNC: 13 MMOL/L (ref 10–20)
AST SERPL W P-5'-P-CCNC: 22 U/L (ref 9–39)
BILIRUB SERPL-MCNC: 0.5 MG/DL (ref 0–1.2)
BUN SERPL-MCNC: 22 MG/DL (ref 6–23)
CALCIUM SERPL-MCNC: 9.4 MG/DL (ref 8.6–10.6)
CHLORIDE SERPL-SCNC: 104 MMOL/L (ref 98–107)
CHOLEST SERPL-MCNC: 171 MG/DL (ref 0–199)
CHOLESTEROL/HDL RATIO: 4.2
CO2 SERPL-SCNC: 26 MMOL/L (ref 21–32)
CREAT SERPL-MCNC: 0.69 MG/DL (ref 0.5–1.05)
EGFRCR SERPLBLD CKD-EPI 2021: >90 ML/MIN/1.73M*2
GLUCOSE SERPL-MCNC: 91 MG/DL (ref 74–99)
HDLC SERPL-MCNC: 41 MG/DL
LDLC SERPL CALC-MCNC: 59 MG/DL
NON HDL CHOLESTEROL: 130 MG/DL (ref 0–149)
POTASSIUM SERPL-SCNC: 3.7 MMOL/L (ref 3.5–5.3)
PROT SERPL-MCNC: 7.5 G/DL (ref 6.4–8.2)
SODIUM SERPL-SCNC: 139 MMOL/L (ref 136–145)
TRIGL SERPL-MCNC: 355 MG/DL (ref 0–149)
VLDL: 71 MG/DL (ref 0–40)

## 2024-03-07 PROCEDURE — 36415 COLL VENOUS BLD VENIPUNCTURE: CPT

## 2024-03-07 PROCEDURE — 80061 LIPID PANEL: CPT

## 2024-03-07 PROCEDURE — 80053 COMPREHEN METABOLIC PANEL: CPT

## 2024-03-11 ENCOUNTER — OFFICE VISIT (OUTPATIENT)
Dept: OPHTHALMOLOGY | Facility: CLINIC | Age: 74
End: 2024-03-11
Payer: MEDICARE

## 2024-03-11 DIAGNOSIS — H35.373 EPIRETINAL MEMBRANE (ERM) OF BOTH EYES: Primary | ICD-10-CM

## 2024-03-11 DIAGNOSIS — H35.342 LAMELLAR MACULAR HOLE OF LEFT EYE: ICD-10-CM

## 2024-03-11 PROCEDURE — 99214 OFFICE O/P EST MOD 30 MIN: CPT | Performed by: OPHTHALMOLOGY

## 2024-03-11 PROCEDURE — 92134 CPTRZ OPH DX IMG PST SGM RTA: CPT | Performed by: OPHTHALMOLOGY

## 2024-03-11 ASSESSMENT — ENCOUNTER SYMPTOMS
RESPIRATORY NEGATIVE: 0
NEUROLOGICAL NEGATIVE: 0
HEMATOLOGIC/LYMPHATIC NEGATIVE: 0
ALLERGIC/IMMUNOLOGIC NEGATIVE: 0
CARDIOVASCULAR NEGATIVE: 0
EYES NEGATIVE: 1
MUSCULOSKELETAL NEGATIVE: 0
CONSTITUTIONAL NEGATIVE: 0
ENDOCRINE NEGATIVE: 0
PSYCHIATRIC NEGATIVE: 0
GASTROINTESTINAL NEGATIVE: 0

## 2024-03-11 ASSESSMENT — TONOMETRY
OS_IOP_MMHG: 15
IOP_METHOD: GOLDMANN APPLANATION
OD_IOP_MMHG: 16

## 2024-03-11 ASSESSMENT — VISUAL ACUITY
OS_CC+: -1
CORRECTION_TYPE: GLASSES
OD_CC+: -2
OS_CC: 20/30
OD_CC: 20/25
METHOD: SNELLEN - LINEAR

## 2024-03-11 ASSESSMENT — CUP TO DISC RATIO
OD_RATIO: 0.4
OS_RATIO: 0.4

## 2024-03-11 ASSESSMENT — EXTERNAL EXAM - LEFT EYE: OS_EXAM: NORMAL

## 2024-03-11 ASSESSMENT — EXTERNAL EXAM - RIGHT EYE: OD_EXAM: NORMAL

## 2024-03-11 ASSESSMENT — SLIT LAMP EXAM - LIDS
COMMENTS: NORMAL
COMMENTS: NORMAL

## 2024-03-11 NOTE — PROGRESS NOTES
Impression          1 H35.373 Epiretinal membrane (erm) of both eyes-Worsening     2 H35.342 Lamellar macular hole of left eye-New     3 H25.811 Combined form of age-related cataract, right eye-Worsening     4 H25.812 Combined form of age-related cataract, left eye-Worsening     5 H04.203 Bilateral epiphora-Stable     6 H50.89 Restrictive strabismus-Improving     7 H50.22 Hypotropia of left eye-Improving     8 E05.00 Thyroid eye disease-Improving     9 H52.10 Myopia-Stable     10 H52.209 Astigmatism-Stable     11 H52.4 Presbyopia-Stable                         Discussion      lamellar hole OS that is not needing any intervention created by:Isaac Smith               Plan             1 yr fu              TODAY   (OU)   - OCT Macula By:Isaac Smith     1 Myopic retinopathy OU     no retinal teras or detachments          2 Epiretinal membrane OU no need for intervention     3 Thyroid disease TSh normal under endocrinology                    Hi quality OCT  scans obtained     signal good          OCT OD - Normal Foveal Contour, No Edema, IS/OS Junction Normal     OCT OS - Normal Foveal Contour, No Edema, IS/OS Junction Normal          additional commnents:myopic

## 2024-04-10 ENCOUNTER — OFFICE VISIT (OUTPATIENT)
Dept: OPHTHALMOLOGY | Facility: CLINIC | Age: 74
End: 2024-04-10
Payer: MEDICARE

## 2024-04-10 DIAGNOSIS — H50.89 RESTRICTIVE STRABISMUS: ICD-10-CM

## 2024-04-10 DIAGNOSIS — H04.203 BILATERAL EPIPHORA: ICD-10-CM

## 2024-04-10 DIAGNOSIS — H52.203 ASTIGMATISM OF BOTH EYES, UNSPECIFIED TYPE: ICD-10-CM

## 2024-04-10 DIAGNOSIS — H50.22 HYPOTROPIA OF LEFT EYE: ICD-10-CM

## 2024-04-10 DIAGNOSIS — H52.4 PRESBYOPIA: ICD-10-CM

## 2024-04-10 DIAGNOSIS — H35.342 LAMELLAR MACULAR HOLE OF LEFT EYE: ICD-10-CM

## 2024-04-10 DIAGNOSIS — H25.812 COMBINED FORM OF AGE-RELATED CATARACT, LEFT EYE: ICD-10-CM

## 2024-04-10 DIAGNOSIS — H52.13 MYOPIA OF BOTH EYES: ICD-10-CM

## 2024-04-10 DIAGNOSIS — E07.9 THYROID EYE DISEASE: ICD-10-CM

## 2024-04-10 DIAGNOSIS — H57.89 THYROID EYE DISEASE: ICD-10-CM

## 2024-04-10 DIAGNOSIS — H35.373 EPIRETINAL MEMBRANE (ERM) OF BOTH EYES: ICD-10-CM

## 2024-04-10 DIAGNOSIS — H25.811 COMBINED FORM OF AGE-RELATED CATARACT, RIGHT EYE: Primary | ICD-10-CM

## 2024-04-10 PROCEDURE — 1159F MED LIST DOCD IN RCRD: CPT | Performed by: OPHTHALMOLOGY

## 2024-04-10 PROCEDURE — 99214 OFFICE O/P EST MOD 30 MIN: CPT | Performed by: OPHTHALMOLOGY

## 2024-04-10 PROCEDURE — 1160F RVW MEDS BY RX/DR IN RCRD: CPT | Performed by: OPHTHALMOLOGY

## 2024-04-10 PROCEDURE — 1123F ACP DISCUSS/DSCN MKR DOCD: CPT | Performed by: OPHTHALMOLOGY

## 2024-04-10 RX ORDER — PHENYLEPHRINE HYDROCHLORIDE 100 MG/ML
1 SOLUTION/ DROPS OPHTHALMIC
Status: CANCELLED | OUTPATIENT
Start: 2024-04-10 | End: 2024-04-10

## 2024-04-10 RX ORDER — CYCLOPENTOLATE HYDROCHLORIDE 10 MG/ML
1 SOLUTION/ DROPS OPHTHALMIC
Status: CANCELLED | OUTPATIENT
Start: 2024-04-10 | End: 2024-04-10

## 2024-04-10 RX ORDER — TETRACAINE HYDROCHLORIDE 5 MG/ML
1 SOLUTION OPHTHALMIC ONCE
Status: CANCELLED | OUTPATIENT
Start: 2024-04-10 | End: 2024-04-10

## 2024-04-10 ASSESSMENT — ENCOUNTER SYMPTOMS
HEMATOLOGIC/LYMPHATIC NEGATIVE: 0
RESPIRATORY NEGATIVE: 0
PSYCHIATRIC NEGATIVE: 0
ALLERGIC/IMMUNOLOGIC NEGATIVE: 0
CARDIOVASCULAR NEGATIVE: 0
CONSTITUTIONAL NEGATIVE: 0
EYES NEGATIVE: 1
MUSCULOSKELETAL NEGATIVE: 0
ENDOCRINE NEGATIVE: 0
GASTROINTESTINAL NEGATIVE: 0
NEUROLOGICAL NEGATIVE: 0

## 2024-04-10 ASSESSMENT — TONOMETRY
OS_IOP_MMHG: 16
OD_IOP_MMHG: 15
IOP_METHOD: TONOPEN

## 2024-04-10 ASSESSMENT — REFRACTION_WEARINGRX
OS_ADD: +2.50
OS_CYLINDER: -1.50
OS_AXIS: 120
OD_AXIS: 165
OS_SPHERE: -4.25
OD_SPHERE: -5.75
OD_CYLINDER: -0.25
OD_ADD: +2.50

## 2024-04-10 ASSESSMENT — CONF VISUAL FIELD
OS_SUPERIOR_NASAL_RESTRICTION: 0
OD_INFERIOR_TEMPORAL_RESTRICTION: 0
OD_INFERIOR_NASAL_RESTRICTION: 0
OD_SUPERIOR_TEMPORAL_RESTRICTION: 0
OD_SUPERIOR_NASAL_RESTRICTION: 0
OD_NORMAL: 1
OS_INFERIOR_TEMPORAL_RESTRICTION: 0
OS_INFERIOR_NASAL_RESTRICTION: 0
OS_NORMAL: 1
OS_SUPERIOR_TEMPORAL_RESTRICTION: 0

## 2024-04-10 ASSESSMENT — REFRACTION_MANIFEST
OS_CYLINDER: -1.75
OD_CYLINDER: -0.25
OS_SPHERE: -4.25
OS_ADD: +2.50
OD_SPHERE: -6.25
OD_ADD: +2.50
OS_AXIS: 120
OD_AXIS: 165

## 2024-04-10 ASSESSMENT — VISUAL ACUITY
CORRECTION_TYPE: GLASSES
OS_BAT_MED: 20/50
METHOD: SNELLEN - LINEAR
OS_CC: 20/30
OD_CC+: +1
OS_CC+: -1
OD_BAT_MED: 20/40
OD_CC: 20/30

## 2024-04-10 ASSESSMENT — CUP TO DISC RATIO
OS_RATIO: .3
OD_RATIO: .3

## 2024-04-10 ASSESSMENT — EXTERNAL EXAM - LEFT EYE: OS_EXAM: NORMAL

## 2024-04-10 ASSESSMENT — EXTERNAL EXAM - RIGHT EYE: OD_EXAM: NORMAL

## 2024-04-10 NOTE — PROGRESS NOTES
Combined form of age-related cataract, right eyeH25.811  Combined form of age-related cataract, left eyeH25.812  -Visually significant cataract left eye. BCVA: 20/25-. Glare: 20/50(M). Symptoms: Visually significant cataracts OU.  Symptoms: Gradual worsening of vision x months. Harder to read small print. More difficulty seeing small words/numbers on TV. Having more trouble seeing road signs when driving. Glare from bright sun. A change in glasses prescription will not result in significant visual improvement at this time.  Indication/anticipated outcome for cataract surgery: To potentially improve visual acuity and improve quality of life/reduce symptoms. To obtain a better view of the retina/optic nerve. To reduce anisometropia).  Based on a comprehensive eye exam performed 4/10/24, a visually significant cataract appears to be the source of decreased vision, diminished quality of life, and impairment of activities of daily living. Discussed option of cataract surgery vs observation. Patient can no longer function adequately with current best corrected visual acuity and wishes to have cataract surgery at this time. Discussed surgical procedure with patient. Discussed potential risks, benefits, and complications of cataract surgery including but not limited to pain, bleeding, infection, inflammation, edema, increased eye pressure, retinal tear/detachment, lens dislocation, ptosis, iris damage, need for additional surgery, need for glasses after surgery, loss of vision/loss of eye. Patient understands and wishes to proceed. All questions were answered. Will schedule cataract surgery left eye. Will evaluate other eye following cataract surgery left eye. Lenstar done 7/19/23.   -Pentacam (7/19/2023) - Irregular.  43.0/43.3 @ 119.2.  OS: Regular oblique.  42.9/43.7 @ 45.5.  Discussed IOL options (standard monofocal, toric, multifocal). Lens chosen: Standard monofocal. Defer/decline toric/multifocal lens at this  time.  Aim: Pl to -0.50. Had thorough discussion with patient re: aim. Discussed that may potentially need glasses for best vision both at distance and at near. Patient states she keeps her glasses on all day and does not take off for near work.   Dominance: right eye  Special considerations: Guarded visual prognosis due to macular pathology. Also, will have anisometropia and may feel off balance following CEIOL OS and before CEIOL OD. No R.   Best contact number: [phone] (home - answering machine)  Drops:   -Ketorolac (or Diclofenac) and Ofloxacin 4x/day starting 1 day prior to surgery; Prednisolone acetate 1% 4x/day starting after surgery    Epiretinal membrane (ERM) of both eyesH35.373  Lamellar hole, left eye  -OCT macula (7/19/23) - SS: 8/10 OD 9/10 OS.  OD: Normal thickness, mild flattening of foveal contour, mild ERM.  Intact IS-OS.  No edema.  OS: Normal thickness, ERM with early lamellar hole/pseudocyst.  Intact IS-OS.  No edema.  261/253.  -Patient saw Dr. Smith 3/11/24 - recommended observation. Guarded visual prognosis with cataract surgery due to macular pathology.    Bilateral [id number]  -May use artificial tears PRN. May consider referral to oculoplastic surgeon for evaluation as needed in the future.     Restrictive jaryuakwbbB91.89  Hypotropia of left eyeH50.22  Thyroid eye boqvpafG79.00  -RUPERTO s/p LIR recession 7.0mm and LMR recession 5.0mm on 10/14/2022 ([place])   -F/u with Dr. Reina PRN.  May consider prisms as needed in the future if symptomatic diplopia following cataract surgery.    TcisxoG41.10  RqzwkfdydvjG68.209  FbfipharvbM20.4  -Current glasses October 2022  -History of RGP wear from age 17 until late 40s. No recent CLW.   -Defer new Rx. No significant improvement in vision with refraction at this time.   -Requests progressives post surgery (scar on nose)      No history of intraocular surgery/refractive surgery.   No FH of glaucoma  (+)FH AMD - older brother (exudative,  receives antiVEGF)

## 2024-05-01 DIAGNOSIS — E78.5 HYPERLIPIDEMIA, UNSPECIFIED HYPERLIPIDEMIA TYPE: ICD-10-CM

## 2024-05-01 RX ORDER — ATORVASTATIN CALCIUM 40 MG/1
40 TABLET, FILM COATED ORAL DAILY
Qty: 100 TABLET | Refills: 2 | Status: SHIPPED | OUTPATIENT
Start: 2024-05-01 | End: 2024-06-04 | Stop reason: SDUPTHER

## 2024-05-08 DIAGNOSIS — I10 PRIMARY HYPERTENSION: ICD-10-CM

## 2024-05-08 RX ORDER — LOSARTAN POTASSIUM AND HYDROCHLOROTHIAZIDE 25; 100 MG/1; MG/1
1 TABLET ORAL DAILY
Qty: 100 TABLET | Refills: 1 | Status: SHIPPED | OUTPATIENT
Start: 2024-05-08 | End: 2024-06-04 | Stop reason: SDUPTHER

## 2024-05-17 DIAGNOSIS — H25.812 COMBINED FORM OF AGE-RELATED CATARACT, LEFT EYE: ICD-10-CM

## 2024-05-17 DIAGNOSIS — H25.811 COMBINED FORM OF AGE-RELATED CATARACT, RIGHT EYE: Primary | ICD-10-CM

## 2024-05-17 RX ORDER — KETOROLAC TROMETHAMINE 5 MG/ML
SOLUTION OPHTHALMIC
Qty: 5 ML | Refills: 2 | Status: SHIPPED | OUTPATIENT
Start: 2024-05-17

## 2024-05-17 RX ORDER — PREDNISOLONE ACETATE 10 MG/ML
SUSPENSION/ DROPS OPHTHALMIC
Qty: 5 ML | Refills: 2 | Status: SHIPPED | OUTPATIENT
Start: 2024-05-17

## 2024-05-17 RX ORDER — OFLOXACIN 3 MG/ML
SOLUTION/ DROPS OPHTHALMIC
Qty: 5 ML | Refills: 2 | Status: SHIPPED | OUTPATIENT
Start: 2024-05-17

## 2024-05-29 ENCOUNTER — ANESTHESIA EVENT (OUTPATIENT)
Dept: OPERATING ROOM | Facility: CLINIC | Age: 74
End: 2024-05-29
Payer: MEDICARE

## 2024-05-30 ENCOUNTER — HOSPITAL ENCOUNTER (OUTPATIENT)
Facility: CLINIC | Age: 74
Setting detail: OUTPATIENT SURGERY
Discharge: HOME | End: 2024-05-30
Attending: OPHTHALMOLOGY | Admitting: OPHTHALMOLOGY
Payer: MEDICARE

## 2024-05-30 ENCOUNTER — ANESTHESIA (OUTPATIENT)
Dept: OPERATING ROOM | Facility: CLINIC | Age: 74
End: 2024-05-30
Payer: MEDICARE

## 2024-05-30 VITALS
TEMPERATURE: 98.1 F | HEIGHT: 65 IN | WEIGHT: 155.42 LBS | BODY MASS INDEX: 25.9 KG/M2 | SYSTOLIC BLOOD PRESSURE: 137 MMHG | DIASTOLIC BLOOD PRESSURE: 63 MMHG | RESPIRATION RATE: 16 BRPM | OXYGEN SATURATION: 97 % | HEART RATE: 61 BPM

## 2024-05-30 DIAGNOSIS — H25.812 COMBINED FORM OF AGE-RELATED CATARACT, LEFT EYE: ICD-10-CM

## 2024-05-30 DIAGNOSIS — H25.811 COMBINED FORM OF AGE-RELATED CATARACT, RIGHT EYE: Primary | ICD-10-CM

## 2024-05-30 PROBLEM — R11.2 PONV (POSTOPERATIVE NAUSEA AND VOMITING): Status: ACTIVE | Noted: 2024-05-30

## 2024-05-30 PROBLEM — Z98.890 PONV (POSTOPERATIVE NAUSEA AND VOMITING): Status: ACTIVE | Noted: 2024-05-30

## 2024-05-30 PROCEDURE — 66984 XCAPSL CTRC RMVL W/O ECP: CPT | Performed by: OPHTHALMOLOGY

## 2024-05-30 PROCEDURE — 2500000004 HC RX 250 GENERAL PHARMACY W/ HCPCS (ALT 636 FOR OP/ED): Performed by: NURSE ANESTHETIST, CERTIFIED REGISTERED

## 2024-05-30 PROCEDURE — C1780 LENS, INTRAOCULAR (NEW TECH): HCPCS | Performed by: OPHTHALMOLOGY

## 2024-05-30 PROCEDURE — 3600000008 HC OR TIME - EACH INCREMENTAL 1 MINUTE - PROCEDURE LEVEL THREE: Performed by: OPHTHALMOLOGY

## 2024-05-30 PROCEDURE — 3600000003 HC OR TIME - INITIAL BASE CHARGE - PROCEDURE LEVEL THREE: Performed by: OPHTHALMOLOGY

## 2024-05-30 PROCEDURE — 3700000002 HC GENERAL ANESTHESIA TIME - EACH INCREMENTAL 1 MINUTE: Performed by: OPHTHALMOLOGY

## 2024-05-30 PROCEDURE — 7100000010 HC PHASE TWO TIME - EACH INCREMENTAL 1 MINUTE: Performed by: OPHTHALMOLOGY

## 2024-05-30 PROCEDURE — 2500000005 HC RX 250 GENERAL PHARMACY W/O HCPCS: Performed by: OPHTHALMOLOGY

## 2024-05-30 PROCEDURE — 7100000009 HC PHASE TWO TIME - INITIAL BASE CHARGE: Performed by: OPHTHALMOLOGY

## 2024-05-30 PROCEDURE — 2500000004 HC RX 250 GENERAL PHARMACY W/ HCPCS (ALT 636 FOR OP/ED): Performed by: OPHTHALMOLOGY

## 2024-05-30 PROCEDURE — 2500000001 HC RX 250 WO HCPCS SELF ADMINISTERED DRUGS (ALT 637 FOR MEDICARE OP): Performed by: OPHTHALMOLOGY

## 2024-05-30 PROCEDURE — 3700000001 HC GENERAL ANESTHESIA TIME - INITIAL BASE CHARGE: Performed by: OPHTHALMOLOGY

## 2024-05-30 RX ORDER — LIDOCAINE IN NACL,ISO-OSMOT/PF 30 MG/3 ML
0.1 SYRINGE (ML) INJECTION ONCE
Status: DISCONTINUED | OUTPATIENT
Start: 2024-05-30 | End: 2024-05-30 | Stop reason: HOSPADM

## 2024-05-30 RX ORDER — MIDAZOLAM HYDROCHLORIDE 1 MG/ML
INJECTION, SOLUTION INTRAMUSCULAR; INTRAVENOUS AS NEEDED
Status: DISCONTINUED | OUTPATIENT
Start: 2024-05-30 | End: 2024-05-30

## 2024-05-30 RX ORDER — POVIDONE-IODINE 5 %
SOLUTION, NON-ORAL OPHTHALMIC (EYE) AS NEEDED
Status: DISCONTINUED | OUTPATIENT
Start: 2024-05-30 | End: 2024-05-30 | Stop reason: HOSPADM

## 2024-05-30 RX ORDER — PHENYLEPHRINE HYDROCHLORIDE 100 MG/ML
1 SOLUTION/ DROPS OPHTHALMIC
Status: COMPLETED | OUTPATIENT
Start: 2024-05-30 | End: 2024-05-30

## 2024-05-30 RX ORDER — TETRACAINE HYDROCHLORIDE 5 MG/ML
1 SOLUTION OPHTHALMIC ONCE
Status: COMPLETED | OUTPATIENT
Start: 2024-05-30 | End: 2024-05-30

## 2024-05-30 RX ORDER — ACETAMINOPHEN 325 MG/1
650 TABLET ORAL EVERY 4 HOURS PRN
Status: DISCONTINUED | OUTPATIENT
Start: 2024-05-30 | End: 2024-05-30 | Stop reason: HOSPADM

## 2024-05-30 RX ORDER — EPINEPHRINE 1 MG/ML
INJECTION, SOLUTION, CONCENTRATE INTRAVENOUS AS NEEDED
Status: DISCONTINUED | OUTPATIENT
Start: 2024-05-30 | End: 2024-05-30 | Stop reason: HOSPADM

## 2024-05-30 RX ORDER — SODIUM CHLORIDE, SODIUM LACTATE, POTASSIUM CHLORIDE, CALCIUM CHLORIDE 600; 310; 30; 20 MG/100ML; MG/100ML; MG/100ML; MG/100ML
INJECTION, SOLUTION INTRAVENOUS CONTINUOUS PRN
Status: DISCONTINUED | OUTPATIENT
Start: 2024-05-30 | End: 2024-05-30

## 2024-05-30 RX ORDER — APREPITANT 40 MG/1
40 CAPSULE ORAL DAILY
Status: CANCELLED | OUTPATIENT
Start: 2024-05-30

## 2024-05-30 RX ORDER — FENTANYL CITRATE 50 UG/ML
INJECTION, SOLUTION INTRAMUSCULAR; INTRAVENOUS AS NEEDED
Status: DISCONTINUED | OUTPATIENT
Start: 2024-05-30 | End: 2024-05-30

## 2024-05-30 RX ORDER — TETRACAINE HYDROCHLORIDE 5 MG/ML
SOLUTION OPHTHALMIC AS NEEDED
Status: DISCONTINUED | OUTPATIENT
Start: 2024-05-30 | End: 2024-05-30 | Stop reason: HOSPADM

## 2024-05-30 RX ORDER — SODIUM CHLORIDE, SODIUM LACTATE, POTASSIUM CHLORIDE, CALCIUM CHLORIDE 600; 310; 30; 20 MG/100ML; MG/100ML; MG/100ML; MG/100ML
100 INJECTION, SOLUTION INTRAVENOUS CONTINUOUS
Status: DISCONTINUED | OUTPATIENT
Start: 2024-05-30 | End: 2024-05-30 | Stop reason: HOSPADM

## 2024-05-30 RX ORDER — CYCLOPENTOLATE HYDROCHLORIDE 10 MG/ML
1 SOLUTION/ DROPS OPHTHALMIC
Status: COMPLETED | OUTPATIENT
Start: 2024-05-30 | End: 2024-05-30

## 2024-05-30 RX ORDER — LIDOCAINE HYDROCHLORIDE 10 MG/ML
INJECTION, SOLUTION EPIDURAL; INFILTRATION; INTRACAUDAL; PERINEURAL AS NEEDED
Status: DISCONTINUED | OUTPATIENT
Start: 2024-05-30 | End: 2024-05-30 | Stop reason: HOSPADM

## 2024-05-30 RX ADMIN — PHENYLEPHRINE HYDROCHLORIDE 1 DROP: 100 SOLUTION/ DROPS OPHTHALMIC at 11:44

## 2024-05-30 RX ADMIN — MIDAZOLAM 1 MG: 1 INJECTION INTRAMUSCULAR; INTRAVENOUS at 12:21

## 2024-05-30 RX ADMIN — CYCLOPENTOLATE HYDROCHLORIDE 1 DROP: 10 SOLUTION/ DROPS OPHTHALMIC at 11:39

## 2024-05-30 RX ADMIN — CYCLOPENTOLATE HYDROCHLORIDE 1 DROP: 10 SOLUTION/ DROPS OPHTHALMIC at 11:34

## 2024-05-30 RX ADMIN — MIDAZOLAM 1 MG: 1 INJECTION INTRAMUSCULAR; INTRAVENOUS at 12:16

## 2024-05-30 RX ADMIN — FENTANYL CITRATE 50 MCG: 50 INJECTION, SOLUTION INTRAMUSCULAR; INTRAVENOUS at 12:23

## 2024-05-30 RX ADMIN — TETRACAINE HYDROCHLORIDE 1 DROP: 5 SOLUTION OPHTHALMIC at 11:33

## 2024-05-30 RX ADMIN — PHENYLEPHRINE HYDROCHLORIDE 1 DROP: 100 SOLUTION/ DROPS OPHTHALMIC at 11:34

## 2024-05-30 RX ADMIN — ACETAMINOPHEN 650 MG: 325 TABLET ORAL at 12:50

## 2024-05-30 RX ADMIN — CYCLOPENTOLATE HYDROCHLORIDE 1 DROP: 10 SOLUTION/ DROPS OPHTHALMIC at 11:44

## 2024-05-30 RX ADMIN — PHENYLEPHRINE HYDROCHLORIDE 1 DROP: 100 SOLUTION/ DROPS OPHTHALMIC at 11:39

## 2024-05-30 RX ADMIN — SODIUM CHLORIDE, SODIUM LACTATE, POTASSIUM CHLORIDE, AND CALCIUM CHLORIDE: .6; .31; .03; .02 INJECTION, SOLUTION INTRAVENOUS at 11:53

## 2024-05-30 ASSESSMENT — PAIN DESCRIPTION - ORIENTATION: ORIENTATION: LEFT

## 2024-05-30 ASSESSMENT — COLUMBIA-SUICIDE SEVERITY RATING SCALE - C-SSRS
1. IN THE PAST MONTH, HAVE YOU WISHED YOU WERE DEAD OR WISHED YOU COULD GO TO SLEEP AND NOT WAKE UP?: NO
6. HAVE YOU EVER DONE ANYTHING, STARTED TO DO ANYTHING, OR PREPARED TO DO ANYTHING TO END YOUR LIFE?: NO
2. HAVE YOU ACTUALLY HAD ANY THOUGHTS OF KILLING YOURSELF?: NO

## 2024-05-30 ASSESSMENT — PAIN - FUNCTIONAL ASSESSMENT
PAIN_FUNCTIONAL_ASSESSMENT: 0-10

## 2024-05-30 ASSESSMENT — PAIN SCALES - GENERAL
PAINLEVEL_OUTOF10: 3
PAINLEVEL_OUTOF10: 2
PAINLEVEL_OUTOF10: 0 - NO PAIN

## 2024-05-30 ASSESSMENT — PAIN DESCRIPTION - LOCATION: LOCATION: EYE

## 2024-05-30 ASSESSMENT — ENCOUNTER SYMPTOMS
PSYCHIATRIC NEGATIVE: 1
CARDIOVASCULAR NEGATIVE: 1
CONSTITUTIONAL NEGATIVE: 1
GASTROINTESTINAL NEGATIVE: 1
RESPIRATORY NEGATIVE: 1

## 2024-05-30 NOTE — H&P
History Of Present Illness  Jaz Marino is a 74 y.o. female presenting with left eye cataract. Here for left eye cataract extraction and intraocular lens placement.     Past Medical History  Past Medical History:   Diagnosis Date    Acute gastric ulcer with hemorrhage     Acute gastric ulcer with hemorrhage    Anxiety     Cataract     Chest pain 08/02/2023    Cough 08/02/2023    Disease of thyroid gland     Frozen shoulder 08/02/2023    Left shoulder pain 08/02/2023    Other conditions influencing health status     Skin Cancer    PONV (postoperative nausea and vomiting)        Surgical History  Past Surgical History:   Procedure Laterality Date    BLEPHAROPLASTY Left     EYE SURGERY      HEMORRHOID SURGERY  04/20/2013    Hemorrhoidectomy    SKIN CANCER EXCISION  04/20/2013    Mohs Micrographic Surgery Face        Social History  She reports that she has never smoked. She has never been exposed to tobacco smoke. She has never used smokeless tobacco. She reports that she does not drink alcohol and does not use drugs.    Family History  Family History   Problem Relation Name Age of Onset    Other (dyslipidemia) Mother      Hypertension Mother      Other (dyslipidemia) Father      Stroke Father      Skin cancer Other          Allergies  Codeine, Pneumococcal vaccine, and Propoxyphene n-acetaminophen    Review of Systems   Constitutional: Negative.    Respiratory: Negative.     Cardiovascular: Negative.    Gastrointestinal: Negative.    Psychiatric/Behavioral: Negative.          Physical Exam  Constitutional:       Appearance: Normal appearance.   HENT:      Head: Normocephalic and atraumatic.   Cardiovascular:      Rate and Rhythm: Regular rhythm.   Pulmonary:      Effort: Pulmonary effort is normal.   Abdominal:      General: Abdomen is flat.      Palpations: Abdomen is soft.   Neurological:      Mental Status: She is alert.          Last Recorded Vitals  Blood pressure 139/70, pulse 73, temperature 37.1 °C  "(98.8 °F), temperature source Temporal, resp. rate 16, height 1.651 m (5' 5\"), weight 70.5 kg (155 lb 6.8 oz), SpO2 95%.    Relevant Results        left eye cataract. Here for left eye cataract extraction and intraocular lens placement.     Assessment/Plan   Principal Problem:    Combined form of age-related cataract, left eye      left eye cataract. Here for left eye cataract extraction and intraocular lens placement.           Nanette Munoz MD    "

## 2024-05-30 NOTE — OP NOTE
Phacoemulsification Cataract with Insertion Intraocular Lens (L) Operative Note     Date: 2024  [place] [facility] [place] [facility] [place]    Name: Jaz Marino, : 1950, Age: 74 y.o., MRN: [id number], Sex: female    Diagnosis  Pre-op Diagnosis     * Combined form of age-related cataract, left eye [H25.812] Post-op Diagnosis     * Combined form of age-related cataract, left eye [H25.812]     Procedures  Phacoemulsification Cataract with Insertion Intraocular Lens  11143 - AR XCAPSL CTRC RMVL INSJ IO LENS PROSTH W/O ECP      Surgeons      * April Vann - Primary    Resident/Fellow/Other Assistant:  Surgeons and Role:  * No surgeons found with a matching role *    Procedure Summary  Anesthesia: Monitor Anesthesia Care  ASA: III  Anesthesia Staff: Anesthesiologist: Robert Loaiza DO  CRNA: MIHAI HendricksCRNA; MARISELA Okeefe  Estimated Blood Loss: 0 mL  Intra-op Medications:   Administrations occurring from 1157 to 1247 on 24:   Medication Name Total Dose   lidocaine PF (Xylocaine) 10 mg/mL (1 %) injection 1 mL   balanced salts (BSS) intraocular solution 515 mL   povidone-iodine 5 % ophthalmic solution 1 Application   tetracaine (PF) 0.5 % ophthalmic solution 2 drop   EPINEPHrine HCl (PF) (Adrenalin) injection 0.3 mg   chondroitin sulf-sod hyaluron (Duovisc) intraocular kit 1 mL              Anesthesia Record               Intraprocedure I/O Totals          Intake    LR infusion 50.00 mL    Total Intake 50 mL          Specimen: No specimens collected     Staff:   Zuleimaulator: Beatriz Fitzgerald Person: Linda         Drains and/or Catheters: * None in log *    Tourniquet Times:         Implants:  Implants       Type Name Action Serial No.       6.0MM X 13MM TECNIS EYHANCE INTRAOCULAR LENS, 1-PIECE, 12.5 DIOPTER, W/TECNIS SIMPLICITY DELIVERY SYSTEM, BICONVEX, UV-BLOCKING HYDROPHOBIC ACRYLIC Implanted [id number]              Findings: as below    Indications: Jaz Marino is an 74  y.o. female who is having surgery for Combined form of age-related cataract, left eye [H25.812].     Procedure Details: Patient name: Jaz Marino   YOB: 1950   MRN: [id number]   Date of surgery: May 30, 2024  Preoperative diagnosis: Combined cataract of the LEFT eye  Postoperative diagnosis: Combined cataract of the LEFT eye  Procedure: Phacoemulsification of cataract with insertion of intraocular lens, LEFT eye   Surgeon: April Vann MD  Resident: Nanette Munoz MD  Anesthesia: MAC  Complications: None  Lens Inserted: Cosme & Cosme DIB00 with a power of +12.50 D. Serial #: [id number]. Exp date: 06/11/2026.    Procedure description: After the risks, benefits, and alternatives of the planned procedure were discussed with the patient, informed consent was obtained at the preoperative evaluation. On the day of surgery, there were no updates to the consent form and any patient questions were answered. The patient was correctly identified in the preoperative area and the LEFT eye was marked as the operative eye. Dilating drops were instilled into the LEFT eye in the preoperative area. The patient was then taken back to the operating room and placed under sedation. The patient was prepped and draped in the standard, sterile ophthalmic fashion in preparation for intraocular surgery.    A lid speculum was placed into the LEFT palpebral fissure and the operating microscope was brought into position. A paracentesis was made in inferotemporal cornea at the limbus with a 1.0mm side port blade using a cotton tipped applicator to provide countertraction. Intracameral lidocaine was injected into the anterior chamber followed by Viscoat viscoelastic. Using 0.12 forceps to stabilize the globe, a 2.4mm keratome blade was used to create a limbal clear-corneal incision superotemporally. A bent-needle cystotome and Utrata forceps were used to create a continuous curvilinear capsulorrhexis. Balanced salt  saline solution on a blunt-tipped cannula was used to achieve hydrodissection.    A phacoemulsification device and a [place] spatula were used to remove the nucleus using a divide-and-conquer technique. Residual cortical material was removed with the irrigation and aspiration handpiece. Provisc viscoelastic was then injected into the eye to reform the anterior chamber and to open the capsular bag. The posterior capsule was inspected and found to be clean and intact. The intraocular lens, Cosme & Cosme DIB00 with a power of +12.50 diopters was injected into the capsular bag. A lens positioner was used to center the lens and ensure good position within the bag. The remaining viscoelastic was removed using irrigation and aspiration. Balanced salt saline solution on a blunt-tipped cannula was then used to hydrate the corneal stroma adjacent to the main wound and paracentesis site as well as to reform the anterior chamber. The wound was checked and found to be watertight with normal intraocular pressure verified using digital palpation. At the conclusion of the case, a well-centered intraocular lens with a good red reflex was observed. Tetracaine, betadine, BSS, and prednisolone acetate drops were instilled into the LEFT eye. The lid speculum and drapes were removed. A clear plastic shield was then taped over the eye. The patient was taken to the recovery room in stable condition, having tolerated the procedure well. There were no complications.        Complications:  None; patient tolerated the procedure well.    Disposition: PACU - hemodynamically stable.  Condition: stable         Additional Details: none    Attending Attestation: I performed the procedure.    April Vann  Phone Number: [phone]

## 2024-05-30 NOTE — ANESTHESIA PREPROCEDURE EVALUATION
Patient: Jaz Marino    Procedure Information       Date/Time: 05/30/24 1145    Procedure: Phacoemulsification Cataract with Insertion Intraocular Lens (Left: Eye)    [place] [facility] [place] [facility] [place] [facility] [place] [facility] [place]    Surgeons: April Vann MD            Relevant Problems   Anesthesia   (+) PONV (postoperative nausea and vomiting)      Cardiac   (+) Benign essential hypertension   (+) Hyperlipidemia   (+) Primary hypertension      Neuro   (+) Generalized anxiety disorder   (+) History of migraine headaches   (+) Situational anxiety      GI (within normal limits)      /Renal (within normal limits)      Liver (within normal limits)      Endocrine   (+) Graves disease   (+) Hyperthyroidism      Hematology (within normal limits)      Musculoskeletal (within normal limits)      ID   (+) Candidal dermatitis      Skin   (+) Basal cell carcinoma       Clinical information reviewed:   Tobacco  Allergies  Meds   Med Hx  Surg Hx   Fam Hx  Soc Hx        NPO Detail:  NPO/Void Status  Date of Last Liquid: 05/30/24  Time of Last Liquid: 0800  Date of Last Solid: 05/29/24  Time of Last Solid: 1800         Physical Exam    Airway  Mallampati: II  TM distance: >3 FB  Neck ROM: full     Cardiovascular    Dental - normal exam     Pulmonary    Abdominal        Anesthesia Plan    History of general anesthesia?: yes  History of complications of general anesthesia?: no    ASA 3     MAC     intravenous induction   Anesthetic plan and risks discussed with patient and spouse.    Plan discussed with CRNA.

## 2024-05-30 NOTE — DISCHARGE INSTRUCTIONS
Surgeon: April Vann MD     Patient name: Jaz Marino    Date of surgery: May 30, 2024        DROP INSTRUCTIONS:    Prednisolone acetate 1% (pink or white cap) - One drop 4 times a day to operative eye    Ofloxacin (tan cap) - One drop 4 times a day to operative eye    Ketorolac (gray cap) - One drop 4 times a day to operative eye    When putting different drops in around the same administration time, please wait 1-2 minutes between drops  Avoid sleeping on side of operative eye  No heavy lifting over 10-15lbs and no bending over  Do not get eye wet, no eye rubbing  Wear sunglasses or glasses during the day and the shield when sleeping at night  Please call immediately if you develop any redness, pain, decreased vision, flashes, or floaters      During office hours (8:30a-4:30p): [phone]  After office hours: [phone]  Hospital : [phone]   Pager: [phone] for Dr. Vigil

## 2024-05-30 NOTE — ANESTHESIA POSTPROCEDURE EVALUATION
Patient: Jaz Marino    Procedure Summary       Date: 05/30/24 Room / Location: [facility] [place] [facility] 03 / [facility] [place] [facility] [place]    Anesthesia Start: 1210 Anesthesia Stop: 1245    Procedure: Phacoemulsification Cataract with Insertion Intraocular Lens (Left: Eye) Diagnosis:       Combined form of age-related cataract, left eye      (Combined form of age-related cataract, left eye [H25.812])    Surgeons: April Vann MD Responsible Provider: Robert Loaiza DO    Anesthesia Type: MAC ASA Status: 3            Anesthesia Type: MAC    Vitals Value Taken Time   /63 05/30/24 1305   Temp 36.7 °C (98.1 °F) 05/30/24 1305   Pulse 61 05/30/24 1305   Resp 16 05/30/24 1305   SpO2 97 % 05/30/24 1305       Anesthesia Post Evaluation    Patient location during evaluation: PACU  Patient participation: complete - patient participated  Level of consciousness: awake  Pain management: satisfactory to patient  Multimodal analgesia pain management approach  Airway patency: patent  Cardiovascular status: acceptable  Respiratory status: acceptable  Hydration status: acceptable  Postoperative Nausea and Vomiting: none    There were no known notable events for this encounter.

## 2024-05-31 ENCOUNTER — OFFICE VISIT (OUTPATIENT)
Dept: OPHTHALMOLOGY | Facility: CLINIC | Age: 74
End: 2024-05-31
Payer: MEDICARE

## 2024-05-31 DIAGNOSIS — H35.342 LAMELLAR MACULAR HOLE OF LEFT EYE: ICD-10-CM

## 2024-05-31 DIAGNOSIS — H57.89 THYROID EYE DISEASE: ICD-10-CM

## 2024-05-31 DIAGNOSIS — H52.13 MYOPIA OF BOTH EYES: ICD-10-CM

## 2024-05-31 DIAGNOSIS — H50.89 RESTRICTIVE STRABISMUS: ICD-10-CM

## 2024-05-31 DIAGNOSIS — H52.203 ASTIGMATISM OF BOTH EYES, UNSPECIFIED TYPE: ICD-10-CM

## 2024-05-31 DIAGNOSIS — E07.9 THYROID EYE DISEASE: ICD-10-CM

## 2024-05-31 DIAGNOSIS — H50.22 HYPOTROPIA OF LEFT EYE: ICD-10-CM

## 2024-05-31 DIAGNOSIS — H52.4 PRESBYOPIA: ICD-10-CM

## 2024-05-31 DIAGNOSIS — Z96.1 PSEUDOPHAKIA: Primary | ICD-10-CM

## 2024-05-31 DIAGNOSIS — H35.373 EPIRETINAL MEMBRANE (ERM) OF BOTH EYES: ICD-10-CM

## 2024-05-31 DIAGNOSIS — H25.811 COMBINED FORM OF AGE-RELATED CATARACT, RIGHT EYE: ICD-10-CM

## 2024-05-31 DIAGNOSIS — H04.203 BILATERAL EPIPHORA: ICD-10-CM

## 2024-05-31 PROCEDURE — 99024 POSTOP FOLLOW-UP VISIT: CPT | Performed by: OPHTHALMOLOGY

## 2024-05-31 ASSESSMENT — TONOMETRY
OS_IOP_MMHG: 25
IOP_METHOD: GOLDMANN APPLANATION
OS_IOP_MMHG: 25
IOP_METHOD: GOLDMANN APPLANATION

## 2024-05-31 ASSESSMENT — REFRACTION_WEARINGRX
OS_ADD: +2.50
OS_SPHERE: -4.25
OS_AXIS: 120
OS_SPHERE: -4.25
OS_CYLINDER: -1.50
OD_SPHERE: -5.75
OS_CYLINDER: -1.50
OS_AXIS: 120
OD_SPHERE: -5.75
OD_ADD: +2.50
OD_CYLINDER: -0.25
OS_ADD: +2.50
OD_ADD: +2.50
OD_CYLINDER: -0.25
OD_AXIS: 165
OD_AXIS: 165

## 2024-05-31 ASSESSMENT — ENCOUNTER SYMPTOMS
HEMATOLOGIC/LYMPHATIC NEGATIVE: 0
GASTROINTESTINAL NEGATIVE: 0
ENDOCRINE NEGATIVE: 0
MUSCULOSKELETAL NEGATIVE: 0
ALLERGIC/IMMUNOLOGIC NEGATIVE: 0
EYES NEGATIVE: 1
RESPIRATORY NEGATIVE: 0
PSYCHIATRIC NEGATIVE: 0
CONSTITUTIONAL NEGATIVE: 0
NEUROLOGICAL NEGATIVE: 0
CARDIOVASCULAR NEGATIVE: 0

## 2024-05-31 ASSESSMENT — VISUAL ACUITY
OS_SC: 20/50
METHOD: SNELLEN - LINEAR
OS_SC+: +2

## 2024-05-31 ASSESSMENT — EXTERNAL EXAM - LEFT EYE: OS_EXAM: NORMAL

## 2024-05-31 ASSESSMENT — EXTERNAL EXAM - RIGHT EYE: OD_EXAM: NORMAL

## 2024-05-31 NOTE — PROGRESS NOTES
Pseudophakia of left eyeZ96.1 - 5/30/24  -Doing well. Good vision. IOP mildly elevated - 1 gtt brimonidine 0.2% given in office.   Prednisolone acetate 1% - 4 times a day  Ofloxacin - 4 times a day  Ketorolac - 4 times a day  No heavy lifting over 10-15 lbs, no bending over, do not get eye wet, no eye rubbing. Wear eye shield at bedtime and sunglasses/glasses during the day. Advised to call if any redness, pain, decreased vision, flashes, floaters. F/u 1 week - refract OU (autorefract first), glare/BAT right eye. Plan for dilation OU if cataract right eye is visually significant and if patient would like to proceed with cataract surgery.     Combined form of age-related cataract, right eyeH25.811  -Tentatively scheduled for CEIOL OD 6/27/24  -Doing well. Good vision. IOP good.  -Will discuss option of cataract surgery right eye at next visit. F/u 1 week - refract OU (autorefract first), glare/BAT right eye. Plan for dilation OU if cataract right eye is visually significant and if patient would like to proceed with cataract surgery.     Epiretinal membrane (ERM) of both eyesH35.373  Lamellar hole, left eye  -OCT macula (7/19/23) - SS: 8/10 OD 9/10 OS.  OD: Normal thickness, mild flattening of foveal contour, mild ERM.  Intact IS-OS.  No edema.  OS: Normal thickness, ERM with early lamellar hole/pseudocyst.  Intact IS-OS.  No edema.  261/253.  -Patient saw Dr. Smith 3/11/24 - recommended observation. Guarded visual prognosis with cataract surgery due to macular pathology.    Bilateral jikqurqdR86.203  -May use artificial tears PRN. May consider referral to oculoplastic surgeon for evaluation as needed in the future.     Restrictive lsvdrwpawjA89.89  Hypotropia of left eyeH50.22  Thyroid eye fwfwhmyA82.00  -RUPERTO s/p LIR recession 7.0mm and LMR recession 5.0mm on 10/14/2022 (Latosha)   -F/u with Dr. Reina PRN.  May consider prisms as needed in the future if symptomatic diplopia following cataract  surgery.    HdhoybK43.10  AdjlcxnlgqtQ71.209  XawfbttqekY75.4  -Current glasses October 2022  -History of RGP wear from age 17 until late 40s. No recent CLW.   -F/u 1 week - refract OU (autorefract first), glare/BAT right eye. Plan for dilation OU if cataract right eye is visually significant and if patient would like to proceed with cataract surgery.   -Requests progressives post surgery (scar on nose)      No history of refractive surgery.   No FH of glaucoma  (+)FH AMD - older brother (exudative, receives antiVEGF)

## 2024-05-31 NOTE — PATIENT INSTRUCTIONS
Surgeon: April Vann MD      Patient name: Jaz Marino    Date of visit: May 31, 2024      left eye    Prednisolone acetate (pink or white cap) - 4 times a day    Ofloxacin (tan cap) - 4 times a day    Ketorolac (gray cap) - 4 times a day      No heavy lifting over 10-15 lbs, no bending over, do not get eye wet, no eye rubbing. Wear eye shield at bedtime and sunglasses/glasses during the day. Please call immediately if you develop any redness, pain, decreased vision, flashes, floaters.       Surgical Scheduler (during office hours): Tracey: [phone]  Office phone (after hours): [phone]  [facility] : [phone]                     Pager: [phone] for Dr. Vigil

## 2024-06-04 DIAGNOSIS — I10 PRIMARY HYPERTENSION: ICD-10-CM

## 2024-06-04 DIAGNOSIS — E78.5 HYPERLIPIDEMIA, UNSPECIFIED HYPERLIPIDEMIA TYPE: ICD-10-CM

## 2024-06-04 RX ORDER — ATORVASTATIN CALCIUM 40 MG/1
40 TABLET, FILM COATED ORAL DAILY
Qty: 90 TABLET | Refills: 1 | Status: SHIPPED | OUTPATIENT
Start: 2024-06-04 | End: 2024-06-11 | Stop reason: SDUPTHER

## 2024-06-04 RX ORDER — LOSARTAN POTASSIUM AND HYDROCHLOROTHIAZIDE 25; 100 MG/1; MG/1
1 TABLET ORAL DAILY
Qty: 90 TABLET | Refills: 1 | Status: SHIPPED | OUTPATIENT
Start: 2024-06-04 | End: 2024-06-11 | Stop reason: SDUPTHER

## 2024-06-04 NOTE — TELEPHONE ENCOUNTER
Last OV 2/29/24    Requested Prescriptions     Pending Prescriptions Disp Refills    atorvastatin (Lipitor) 40 mg tablet 100 tablet 2     Sig: Take 1 tablet (40 mg) by mouth once daily.    losartan-hydrochlorothiazide (Hyzaar) 100-25 mg tablet 100 tablet 1     Sig: Take 1 tablet by mouth once daily.

## 2024-06-07 ENCOUNTER — OFFICE VISIT (OUTPATIENT)
Dept: OPHTHALMOLOGY | Facility: CLINIC | Age: 74
End: 2024-06-07
Payer: MEDICARE

## 2024-06-07 DIAGNOSIS — H50.22 HYPOTROPIA OF LEFT EYE: ICD-10-CM

## 2024-06-07 DIAGNOSIS — H57.89 THYROID EYE DISEASE: ICD-10-CM

## 2024-06-07 DIAGNOSIS — H52.4 PRESBYOPIA: ICD-10-CM

## 2024-06-07 DIAGNOSIS — H52.13 MYOPIA OF BOTH EYES: ICD-10-CM

## 2024-06-07 DIAGNOSIS — Z96.1 PSEUDOPHAKIA: ICD-10-CM

## 2024-06-07 DIAGNOSIS — H50.89 RESTRICTIVE STRABISMUS: ICD-10-CM

## 2024-06-07 DIAGNOSIS — E07.9 THYROID EYE DISEASE: ICD-10-CM

## 2024-06-07 DIAGNOSIS — H35.342 LAMELLAR MACULAR HOLE OF LEFT EYE: ICD-10-CM

## 2024-06-07 DIAGNOSIS — H04.203 BILATERAL EPIPHORA: ICD-10-CM

## 2024-06-07 DIAGNOSIS — H52.203 ASTIGMATISM OF BOTH EYES, UNSPECIFIED TYPE: ICD-10-CM

## 2024-06-07 DIAGNOSIS — H35.373 EPIRETINAL MEMBRANE (ERM) OF BOTH EYES: ICD-10-CM

## 2024-06-07 DIAGNOSIS — H25.811 COMBINED FORM OF AGE-RELATED CATARACT, RIGHT EYE: Primary | ICD-10-CM

## 2024-06-07 PROCEDURE — 99024 POSTOP FOLLOW-UP VISIT: CPT | Performed by: OPHTHALMOLOGY

## 2024-06-07 RX ORDER — PHENYLEPHRINE HYDROCHLORIDE 100 MG/ML
1 SOLUTION/ DROPS OPHTHALMIC
OUTPATIENT
Start: 2024-06-07 | End: 2024-06-07

## 2024-06-07 RX ORDER — TETRACAINE HYDROCHLORIDE 5 MG/ML
1 SOLUTION OPHTHALMIC ONCE
OUTPATIENT
Start: 2024-06-07 | End: 2024-06-07

## 2024-06-07 RX ORDER — CYCLOPENTOLATE HYDROCHLORIDE 10 MG/ML
1 SOLUTION/ DROPS OPHTHALMIC
OUTPATIENT
Start: 2024-06-07 | End: 2024-06-07

## 2024-06-07 ASSESSMENT — REFRACTION_WEARINGRX
OD_AXIS: 165
OS_SPHERE: -4.25
OD_ADD: +2.50
OS_ADD: +2.50
OD_ADD: +2.50
OD_SPHERE: -5.75
OD_SPHERE: -5.75
OS_SPHERE: -4.25
OD_CYLINDER: -0.25
OS_AXIS: 120
OD_CYLINDER: -0.25
OS_CYLINDER: -1.50
OS_AXIS: 120
OS_ADD: +2.50
OS_CYLINDER: -1.50
OD_AXIS: 165

## 2024-06-07 ASSESSMENT — CUP TO DISC RATIO
OD_RATIO: .3
OS_RATIO: .3

## 2024-06-07 ASSESSMENT — REFRACTION_MANIFEST
OS_SPHERE: +0.50
OS_AXIS: 105
OS_AXIS: 105
OD_CYLINDER: -0.25
OS_ADD: +2.50
OS_CYLINDER: -1.00
OD_SPHERE: -6.00
OD_SPHERE: -6.25
OD_ADD: +2.50
OD_CYLINDER: -0.50
OS_SPHERE: +0.75
OD_AXIS: 080
METHOD_AUTOREFRACTION: 1
OS_CYLINDER: -1.00
OD_AXIS: 165

## 2024-06-07 ASSESSMENT — VISUAL ACUITY
OS_SC: 20/40
METHOD: SNELLEN - LINEAR
OD_CC: 20/40
OD_BAT_MED: 20/50

## 2024-06-07 ASSESSMENT — ENCOUNTER SYMPTOMS
ENDOCRINE NEGATIVE: 0
MUSCULOSKELETAL NEGATIVE: 0
NEUROLOGICAL NEGATIVE: 0
HEMATOLOGIC/LYMPHATIC NEGATIVE: 0
RESPIRATORY NEGATIVE: 0
GASTROINTESTINAL NEGATIVE: 0
CONSTITUTIONAL NEGATIVE: 0
CARDIOVASCULAR NEGATIVE: 0
EYES NEGATIVE: 1
PSYCHIATRIC NEGATIVE: 0
ALLERGIC/IMMUNOLOGIC NEGATIVE: 0

## 2024-06-07 ASSESSMENT — TONOMETRY
IOP_METHOD: GOLDMANN APPLANATION
OD_IOP_MMHG: 20
OS_IOP_MMHG: 18

## 2024-06-07 ASSESSMENT — EXTERNAL EXAM - RIGHT EYE: OD_EXAM: NORMAL

## 2024-06-07 ASSESSMENT — EXTERNAL EXAM - LEFT EYE: OS_EXAM: NORMAL

## 2024-06-07 NOTE — PATIENT INSTRUCTIONS
Surgeon: April Vann MD                   [phone]      Patient name: Jaz Marino    Date of visit: June 7, 2024      left eye    Prednisolone acetate (pink or white cap) - 4 times a day for 1 week, 3 times a day for 1 week, 2 times a day for 1 week, once a day for 1 week, then stop    Ofloxacin (tan cap) - 4 times a day for 3 more days, then stop    Ketorolac (gray cap) - 4 times a day for 1 more week, then stop      No heavy lifting over 15-20 lbs, try not to get eye wet, no eye rubbing. You may stop wearing the eye shield at night. Please continue wearing glasses or sunglasses during the day to protect your eyes. Please call immediately if you develop any redness, pain, decreased vision, flashes, floaters.     Surgical Scheduler (during office hours) - Tracey: [phone]

## 2024-06-07 NOTE — PROGRESS NOTES
Combined form of age-related cataract, right eyeH25.811  -Visually significant cataract right eye. BCVA: 20/30. Glare: 20/50 (M). Symptoms: Visually significant cataracts OU.  Symptoms: Gradual worsening of vision x months. Harder to read small print. More difficulty seeing small words/numbers on TV. Having more trouble seeing road signs when driving. Glare from bright sun. A change in glasses prescription will not result in significant visual improvement at this time.  Indication/anticipated outcome for cataract surgery: To potentially improve visual acuity and improve quality of life/reduce symptoms. To obtain a better view of the retina/optic nerve. To reduce anisometropia).  Based on a comprehensive eye exam performed 6/7/24, a visually significant cataract appears to be the source of decreased vision, diminished quality of life, and impairment of activities of daily living. Discussed option of cataract surgery vs observation. Patient can no longer function adequately with current best corrected visual acuity and wishes to have cataract surgery at this time. Discussed surgical procedure with patient. Discussed potential risks, benefits, and complications of cataract surgery including but not limited to pain, bleeding, infection, inflammation, edema, increased eye pressure, retinal tear/detachment, lens dislocation, ptosis, iris damage, need for additional surgery, need for glasses after surgery, loss of vision/loss of eye. Patient understands and wishes to proceed. All questions were answered. Will schedule cataract surgery right eye.  Lenstar done 7/19/23.   -Pentacam (7/19/2023) - Irregular.  43.0/43.3 @ 119.2.  OS: Regular oblique.  42.9/43.7 @ 45.5.  Discussed IOL options (standard monofocal, toric, multifocal). Lens chosen: Standard monofocal. Defer/decline toric/multifocal lens at this time.  Aim: Pl to -0.50. Had thorough discussion with patient re: aim. Discussed that may potentially need glasses for  best vision both at distance and at near. Patient states she keeps her glasses on all day and does not take off for near work.   Dominance: right eye  Special considerations: Guarded visual prognosis due to macular pathology. Also, will have anisometropia and may feel off balance following CEIOL OS and before CEIOL OD. No R.   Best contact number: [phone] (home - answering machine)  Drops:   -Ketorolac (or Diclofenac) and Ofloxacin 4x/day starting 1 day prior to surgery; Prednisolone acetate 1% 4x/day starting after surgery    Pseudophakia of left eyeZ96.1 - 5/30/24  -Doing well. Good vision. IOP good.  Prednisolone acetate 1% - 4/3/2/1 weekly taper  Ofloxacin - 4 times a day x 3 more days, then stop  Ketorolac - 4 times a day x 1 more week, then stop  No heavy lifting over 15-20 lbs, do not get eye wet, no eye rubbing. Discontinue eye shield at bedtime but wear sunglasses/glasses during the day. Advised to call if any redness, pain, decreased vision, flashes, floaters.     Epiretinal membrane (ERM) of both eyesH35.373  Lamellar hole, left eye  -OCT macula (7/19/23) - SS: 8/10 OD 9/10 OS.  OD: Normal thickness, mild flattening of foveal contour, mild ERM.  Intact IS-OS.  No edema.  OS: Normal thickness, ERM with early lamellar hole/pseudocyst.  Intact IS-OS.  No edema.  261/253.  -Patient saw Dr. Smith 3/11/24 - recommended observation. Guarded visual prognosis with cataract surgery due to macular pathology.    Bilateral eowxowvxN86.203  -May use artificial tears PRN. May consider referral to oculoplastic surgeon for evaluation as needed in the future.     Restrictive vzhjgbscieC06.89  Hypotropia of left eyeH50.22  Thyroid eye gfwbdebW68.00  -RUPERTO s/p LIR recession 7.0mm and LMR recession 5.0mm on 10/14/2022 (Latosha)   -F/u with Dr. Reina PRN.  May consider prisms as needed in the future if symptomatic diplopia following cataract surgery.    AeowzbK12.10  QvwzjcrsctpP28.209  PgirdlgocmG14.4  -Current  glasses October 2022  -History of RGP wear from age 17 until late 40s. No recent CLW.   -Requests progressives post surgery (scar on nose)  -Defer new Rx. No significant improvement in vision with refraction at this time.       No history of refractive surgery.   No FH of glaucoma  (+)FH AMD - older brother (exudative, receives antiVEGF)

## 2024-06-11 DIAGNOSIS — E78.5 HYPERLIPIDEMIA, UNSPECIFIED HYPERLIPIDEMIA TYPE: ICD-10-CM

## 2024-06-11 DIAGNOSIS — I10 PRIMARY HYPERTENSION: ICD-10-CM

## 2024-06-11 RX ORDER — ATORVASTATIN CALCIUM 40 MG/1
40 TABLET, FILM COATED ORAL DAILY
Qty: 90 TABLET | Refills: 1 | Status: SHIPPED | OUTPATIENT
Start: 2024-06-11

## 2024-06-11 RX ORDER — LOSARTAN POTASSIUM AND HYDROCHLOROTHIAZIDE 25; 100 MG/1; MG/1
1 TABLET ORAL DAILY
Qty: 90 TABLET | Refills: 1 | Status: SHIPPED | OUTPATIENT
Start: 2024-06-11

## 2024-06-11 NOTE — TELEPHONE ENCOUNTER
Medication Name:  atorvastatin ( Lipitor )   Dose: 40 mg tablet   Frequency:Take 1 tablet  by mouth once daily.   Quantity left: 20     Medication Name: Losartan- hydrochlorothiazide ( Hyzaar)   Dose: 100- 25 mg   Frequency: Take 1 tablet by mouth once daily.   Pharmacy: [facility] - [place], [place]   Quantity left: 20   Last appointment: 02/29/2024  Next appointment: 08/29/2024

## 2024-06-26 ENCOUNTER — ANESTHESIA EVENT (OUTPATIENT)
Dept: OPERATING ROOM | Facility: CLINIC | Age: 74
End: 2024-06-26
Payer: MEDICARE

## 2024-06-27 ENCOUNTER — ANESTHESIA (OUTPATIENT)
Dept: OPERATING ROOM | Facility: CLINIC | Age: 74
End: 2024-06-27
Payer: MEDICARE

## 2024-06-27 ENCOUNTER — HOSPITAL ENCOUNTER (OUTPATIENT)
Facility: CLINIC | Age: 74
Setting detail: OUTPATIENT SURGERY
Discharge: HOME | End: 2024-06-27
Attending: OPHTHALMOLOGY | Admitting: OPHTHALMOLOGY
Payer: MEDICARE

## 2024-06-27 VITALS
SYSTOLIC BLOOD PRESSURE: 126 MMHG | HEART RATE: 67 BPM | HEIGHT: 65 IN | WEIGHT: 154.54 LBS | OXYGEN SATURATION: 97 % | RESPIRATION RATE: 16 BRPM | BODY MASS INDEX: 25.75 KG/M2 | TEMPERATURE: 97.2 F | DIASTOLIC BLOOD PRESSURE: 59 MMHG

## 2024-06-27 DIAGNOSIS — H25.811 COMBINED FORM OF AGE-RELATED CATARACT, RIGHT EYE: Primary | ICD-10-CM

## 2024-06-27 PROCEDURE — 3700000001 HC GENERAL ANESTHESIA TIME - INITIAL BASE CHARGE: Performed by: OPHTHALMOLOGY

## 2024-06-27 PROCEDURE — 2500000005 HC RX 250 GENERAL PHARMACY W/O HCPCS: Performed by: OPHTHALMOLOGY

## 2024-06-27 PROCEDURE — 3600000003 HC OR TIME - INITIAL BASE CHARGE - PROCEDURE LEVEL THREE: Performed by: OPHTHALMOLOGY

## 2024-06-27 PROCEDURE — 2500000004 HC RX 250 GENERAL PHARMACY W/ HCPCS (ALT 636 FOR OP/ED): Performed by: ANESTHESIOLOGIST ASSISTANT

## 2024-06-27 PROCEDURE — 2500000002 HC RX 250 W HCPCS SELF ADMINISTERED DRUGS (ALT 637 FOR MEDICARE OP, ALT 636 FOR OP/ED): Performed by: ANESTHESIOLOGY

## 2024-06-27 PROCEDURE — 99100 ANES PT EXTEME AGE<1 YR&>70: CPT | Performed by: ANESTHESIOLOGY

## 2024-06-27 PROCEDURE — 2500000001 HC RX 250 WO HCPCS SELF ADMINISTERED DRUGS (ALT 637 FOR MEDICARE OP): Performed by: OPHTHALMOLOGY

## 2024-06-27 PROCEDURE — 3600000008 HC OR TIME - EACH INCREMENTAL 1 MINUTE - PROCEDURE LEVEL THREE: Performed by: OPHTHALMOLOGY

## 2024-06-27 PROCEDURE — 66984 XCAPSL CTRC RMVL W/O ECP: CPT | Performed by: OPHTHALMOLOGY

## 2024-06-27 PROCEDURE — 7100000010 HC PHASE TWO TIME - EACH INCREMENTAL 1 MINUTE: Performed by: OPHTHALMOLOGY

## 2024-06-27 PROCEDURE — A66984 PR REMV CATARACT EXTRACAP,INSERT LENS: Performed by: ANESTHESIOLOGIST ASSISTANT

## 2024-06-27 PROCEDURE — A66984 PR REMV CATARACT EXTRACAP,INSERT LENS: Performed by: ANESTHESIOLOGY

## 2024-06-27 PROCEDURE — 3700000002 HC GENERAL ANESTHESIA TIME - EACH INCREMENTAL 1 MINUTE: Performed by: OPHTHALMOLOGY

## 2024-06-27 PROCEDURE — C1780 LENS, INTRAOCULAR (NEW TECH): HCPCS | Performed by: OPHTHALMOLOGY

## 2024-06-27 PROCEDURE — 2500000005 HC RX 250 GENERAL PHARMACY W/O HCPCS: Performed by: ANESTHESIOLOGIST ASSISTANT

## 2024-06-27 PROCEDURE — 7100000009 HC PHASE TWO TIME - INITIAL BASE CHARGE: Performed by: OPHTHALMOLOGY

## 2024-06-27 DEVICE — IMPLANTABLE DEVICE: Type: IMPLANTABLE DEVICE | Site: EYE | Status: FUNCTIONAL

## 2024-06-27 RX ORDER — MIDAZOLAM HYDROCHLORIDE 1 MG/ML
INJECTION, SOLUTION INTRAMUSCULAR; INTRAVENOUS AS NEEDED
Status: DISCONTINUED | OUTPATIENT
Start: 2024-06-27 | End: 2024-06-27

## 2024-06-27 RX ORDER — LIDOCAINE HYDROCHLORIDE 10 MG/ML
INJECTION, SOLUTION EPIDURAL; INFILTRATION; INTRACAUDAL; PERINEURAL AS NEEDED
Status: DISCONTINUED | OUTPATIENT
Start: 2024-06-27 | End: 2024-06-27 | Stop reason: HOSPADM

## 2024-06-27 RX ORDER — ACETAMINOPHEN 325 MG/1
650 TABLET ORAL ONCE
Status: COMPLETED | OUTPATIENT
Start: 2024-06-27 | End: 2024-06-27

## 2024-06-27 RX ORDER — WATER 1 ML/ML
IRRIGANT IRRIGATION AS NEEDED
Status: DISCONTINUED | OUTPATIENT
Start: 2024-06-27 | End: 2024-06-27 | Stop reason: HOSPADM

## 2024-06-27 RX ORDER — SODIUM CHLORIDE, SODIUM LACTATE, POTASSIUM CHLORIDE, CALCIUM CHLORIDE 600; 310; 30; 20 MG/100ML; MG/100ML; MG/100ML; MG/100ML
100 INJECTION, SOLUTION INTRAVENOUS CONTINUOUS
Status: DISCONTINUED | OUTPATIENT
Start: 2024-06-27 | End: 2024-06-27 | Stop reason: HOSPADM

## 2024-06-27 RX ORDER — APREPITANT 40 MG/1
40 CAPSULE ORAL ONCE
Status: COMPLETED | OUTPATIENT
Start: 2024-06-27 | End: 2024-06-27

## 2024-06-27 RX ORDER — FENTANYL CITRATE 50 UG/ML
INJECTION, SOLUTION INTRAMUSCULAR; INTRAVENOUS AS NEEDED
Status: DISCONTINUED | OUTPATIENT
Start: 2024-06-27 | End: 2024-06-27

## 2024-06-27 RX ORDER — PHENYLEPHRINE HYDROCHLORIDE 100 MG/ML
1 SOLUTION/ DROPS OPHTHALMIC
Status: COMPLETED | OUTPATIENT
Start: 2024-06-27 | End: 2024-06-27

## 2024-06-27 RX ORDER — LIDOCAINE IN NACL,ISO-OSMOT/PF 30 MG/3 ML
0.1 SYRINGE (ML) INJECTION ONCE
Status: DISCONTINUED | OUTPATIENT
Start: 2024-06-27 | End: 2024-06-27 | Stop reason: HOSPADM

## 2024-06-27 RX ORDER — CYCLOPENTOLATE HYDROCHLORIDE 10 MG/ML
1 SOLUTION/ DROPS OPHTHALMIC
Status: COMPLETED | OUTPATIENT
Start: 2024-06-27 | End: 2024-06-27

## 2024-06-27 RX ORDER — LIDOCAINE HYDROCHLORIDE 20 MG/ML
INJECTION, SOLUTION INFILTRATION; PERINEURAL AS NEEDED
Status: DISCONTINUED | OUTPATIENT
Start: 2024-06-27 | End: 2024-06-27

## 2024-06-27 RX ORDER — TETRACAINE HYDROCHLORIDE 5 MG/ML
1 SOLUTION OPHTHALMIC ONCE
Status: COMPLETED | OUTPATIENT
Start: 2024-06-27 | End: 2024-06-27

## 2024-06-27 RX ORDER — SODIUM CHLORIDE 0.9 % (FLUSH) 0.9 %
SYRINGE (ML) INJECTION AS NEEDED
Status: DISCONTINUED | OUTPATIENT
Start: 2024-06-27 | End: 2024-06-27

## 2024-06-27 RX ORDER — TETRACAINE HYDROCHLORIDE 5 MG/ML
SOLUTION OPHTHALMIC AS NEEDED
Status: DISCONTINUED | OUTPATIENT
Start: 2024-06-27 | End: 2024-06-27 | Stop reason: HOSPADM

## 2024-06-27 RX ORDER — POVIDONE-IODINE 5 %
SOLUTION, NON-ORAL OPHTHALMIC (EYE) AS NEEDED
Status: DISCONTINUED | OUTPATIENT
Start: 2024-06-27 | End: 2024-06-27 | Stop reason: HOSPADM

## 2024-06-27 RX ADMIN — CYCLOPENTOLATE HYDROCHLORIDE 1 DROP: 10 SOLUTION/ DROPS OPHTHALMIC at 10:27

## 2024-06-27 RX ADMIN — PHENYLEPHRINE HYDROCHLORIDE 1 DROP: 100 SOLUTION/ DROPS OPHTHALMIC at 10:22

## 2024-06-27 RX ADMIN — PHENYLEPHRINE HYDROCHLORIDE 1 DROP: 100 SOLUTION/ DROPS OPHTHALMIC at 10:17

## 2024-06-27 RX ADMIN — APREPITANT 40 MG: 40 CAPSULE ORAL at 10:17

## 2024-06-27 RX ADMIN — CYCLOPENTOLATE HYDROCHLORIDE 1 DROP: 10 SOLUTION/ DROPS OPHTHALMIC at 10:17

## 2024-06-27 RX ADMIN — TETRACAINE HYDROCHLORIDE 1 DROP: 5 SOLUTION OPHTHALMIC at 10:15

## 2024-06-27 RX ADMIN — PHENYLEPHRINE HYDROCHLORIDE 1 DROP: 100 SOLUTION/ DROPS OPHTHALMIC at 10:27

## 2024-06-27 RX ADMIN — ACETAMINOPHEN 650 MG: 325 TABLET ORAL at 13:04

## 2024-06-27 RX ADMIN — CYCLOPENTOLATE HYDROCHLORIDE 1 DROP: 10 SOLUTION/ DROPS OPHTHALMIC at 10:22

## 2024-06-27 ASSESSMENT — PAIN - FUNCTIONAL ASSESSMENT
PAIN_FUNCTIONAL_ASSESSMENT: 0-10

## 2024-06-27 ASSESSMENT — PAIN SCALES - GENERAL
PAINLEVEL_OUTOF10: 0 - NO PAIN
PAINLEVEL_OUTOF10: 3
PAINLEVEL_OUTOF10: 3
PAINLEVEL_OUTOF10: 2
PAINLEVEL_OUTOF10: 3

## 2024-06-27 ASSESSMENT — COLUMBIA-SUICIDE SEVERITY RATING SCALE - C-SSRS
6. HAVE YOU EVER DONE ANYTHING, STARTED TO DO ANYTHING, OR PREPARED TO DO ANYTHING TO END YOUR LIFE?: NO
1. IN THE PAST MONTH, HAVE YOU WISHED YOU WERE DEAD OR WISHED YOU COULD GO TO SLEEP AND NOT WAKE UP?: NO
2. HAVE YOU ACTUALLY HAD ANY THOUGHTS OF KILLING YOURSELF?: NO

## 2024-06-27 NOTE — ANESTHESIA PREPROCEDURE EVALUATION
Patient: Jaz Marino    Procedure Information       Date/Time: 06/27/24 1210    Procedure: Phacoemulsification Cataract with Insertion Intraocular Lens (Right: Eye)    [place] [facility] [place] [facility] [place] [facility] [place] [facility] [place]    Surgeons: April Vann MD            Relevant Problems   Anesthesia   (+) PONV (postoperative nausea and vomiting)      Cardiac   (+) Benign essential hypertension   (+) Hyperlipidemia   (+) Primary hypertension      Pulmonary (within normal limits)      Neuro   (+) Generalized anxiety disorder   (+) History of migraine headaches   (+) Situational anxiety      GI (within normal limits)      /Renal (within normal limits)      Liver (within normal limits)      Endocrine   (+) Graves disease   (+) Hyperthyroidism (In remission)      Hematology (within normal limits)      Musculoskeletal (within normal limits)      HEENT (within normal limits)      ID   (+) Candidal dermatitis      Skin   (+) Basal cell carcinoma      GYN (within normal limits)       Clinical information reviewed:   Tobacco  Allergies  Meds   Med Hx  Surg Hx   Fam Hx  Soc Hx        NPO Detail:  NPO/Void Status  Date of Last Liquid: 06/27/24  Time of Last Liquid: 0700  Date of Last Solid: 06/26/24  Time of Last Solid: 1600         Physical Exam    Airway  Mallampati: III  TM distance: >3 FB     Cardiovascular    Dental - normal exam     Pulmonary    Abdominal        Anesthesia Plan    History of general anesthesia?: yes  History of complications of general anesthesia?: no    ASA 3     MAC     intravenous induction   Anesthetic plan and risks discussed with patient.    Plan discussed with CRNA.

## 2024-06-27 NOTE — OP NOTE
Phacoemulsification Cataract with Insertion Intraocular Lens (R) Operative Note     Date: 2024  [place] [facility] [place] [facility] [place]    Name: Jaz Marino, : 1950, Age: 74 y.o., MRN: [id number], Sex: female    Diagnosis  Pre-op Diagnosis     * Combined form of age-related cataract, right eye [H25.811] Post-op Diagnosis     * Combined form of age-related cataract, right eye [H25.811]     Procedures  Phacoemulsification Cataract with Insertion Intraocular Lens  68204 - NJ XCAPSL CTRC RMVL INSJ IO LENS PROSTH W/O ECP      Surgeons      * April Vann - Primary    Resident/Fellow/Other Assistant:  Surgeons and Role:  * No surgeons found with a matching role *    Procedure Summary  Anesthesia: Monitor Anesthesia Care  ASA: III  Anesthesia Staff: Anesthesiologist: Robert Loaiza DO  C-AA: MAHENDRA Phillip  Estimated Blood Loss: 0 mL  Intra-op Medications:   Administrations occurring from 1155 to 1245 on 24:   Medication Name Total Dose   lidocaine PF (Xylocaine) 10 mg/mL (1 %) injection 2 mL   balanced salts (BSS) intraocular solution 500 mL   sterile water irrigation solution 250 mL   povidone-iodine 5 % ophthalmic solution 1 Application   tetracaine (PF) 0.5 % ophthalmic solution 2 drop   chondroitin sulf-sod hyaluron (Duovisc) intraocular kit 0.5 mL   balanced salts (BSS) intraocular solution 15 mL              Anesthesia Record               Intraprocedure I/O Totals       None           Specimen: No specimens collected     Staff:   Scrub Person: Carey  Circulator: Neelima  Circulator: Claudia Cantu Scrub: Mari         Drains and/or Catheters: * None in log *    Tourniquet Times:         Implants:  Implants       Type Name Action Serial No.      Lens DIBOO Implanted [id number]              Findings: as below    Indications: Jaz Marino is an 74 y.o. female who is having surgery for Combined form of age-related cataract, right eye [H25.811].       Procedure Details: Patient name:  Jaz Marino   YOB: 1950   MRN: [id number]   Date of surgery: June 27, 2024  Preoperative diagnosis: Combined cataract of the RIGHT eye  Postoperative diagnosis: Combined cataract of the RIGHT eye  Procedure: Phacoemulsification of cataract with insertion of intraocular lens, RIGHT eye   Surgeon: April Vann MD  Resident: Ignacio Aguiar MD  Anesthesia: MAC  Complications: None  Lens Inserted: [employer] Cos[employer] DIB00 with a power of +12.00 D. Serial #: [id number]. Exp date: 05/07/2027.    Procedure description: After the risks, benefits, and alternatives of the planned procedure were discussed with the patient, informed consent was obtained at the preoperative evaluation. On the day of surgery, there were no updates to the consent form and any patient questions were answered. The patient was correctly identified in the preoperative area and the RIGHT eye was marked as the operative eye. Dilating drops were instilled into the RIGHT eye in the preoperative area. The patient was then taken back to the operating room and placed under sedation. The patient was prepped and draped in the standard, sterile ophthalmic fashion in preparation for intraocular surgery.    A lid speculum was placed into the RIGHT palpebral fissure and the operating microscope was brought into position. A paracentesis was made in superotemporal cornea at the limbus with a 1.0mm side port blade using a cotton tipped applicator to provide countertraction. Intracameral lidocaine was injected into the anterior chamber followed by Viscoat viscoelastic. Using 0.12 forceps to stabilize the globe, a 2.4mm keratome blade was used to create a limbal clear-corneal incision inferotemporally. A bent-needle cystotome and Utrata forceps were used to create a continuous curvilinear capsulorrhexis. Balanced salt saline solution on a blunt-tipped cannula was used to achieve hydrodissection.    A phacoemulsification device and a Double Spring  spatula were used to remove the nucleus using a divide-and-conquer technique. Residual cortical material was removed with the irrigation and aspiration handpiece. Provisc viscoelastic was then injected into the eye to reform the anterior chamber and to open the capsular bag. The posterior capsule was inspected and found to be clean and intact. The intraocular lens, Cosme [employer]me DIB00 with a power of +12.00 diopters was injected into the capsular bag. A lens positioner was used to center the lens and ensure good position within the bag. The remaining viscoelastic was removed using irrigation and aspiration. Balanced salt saline solution on a blunt-tipped cannula was then used to hydrate the corneal stroma adjacent to the main wound and paracentesis site as well as to reform the anterior chamber. The wound was checked and found to be watertight with normal intraocular pressure verified using digital palpation. At the conclusion of the case, a well-centered intraocular lens with a good red reflex was observed. Tetracaine, betadine, and BSS were instilled into the RIGHT eye. The lid speculum and drapes were removed. A clear plastic shield was then taped over the eye. The patient was taken to the recovery room in stable condition, having tolerated the procedure well. There were no complications.        Complications:  None; patient tolerated the procedure well.    Disposition: PACU - hemodynamically stable.  Condition: stable         Additional Details: none    Attending Attestation: I performed the procedure.    April Vann  Phone Number: [phone]

## 2024-06-27 NOTE — H&P
History Of Present Illness  Jaz Marino is a 74 y.o. female presenting with visually significant cataract right eye.     Past Medical History  She has a past medical history of Acute gastric ulcer with hemorrhage, Anxiety, Cataract, Chest pain (08/02/2023), Cough (08/02/2023), Disease of thyroid gland, Frozen shoulder (08/02/2023), Left shoulder pain (08/02/2023), Other conditions influencing health status, and PONV (postoperative nausea and vomiting).    Surgical History  She has a past surgical history that includes Skin cancer excision (04/20/2013); Hemorrhoid surgery (04/20/2013); Eye surgery; Blepharoplasty (Left); and Cataract extraction w/  intraocular lens implant (Left, 05/30/2024).     Social History  She reports that she has never smoked. She has never been exposed to tobacco smoke. She has never used smokeless tobacco. She reports that she does not drink alcohol and does not use drugs.    Family History  Family History   Problem Relation Name Age of Onset    Other (dyslipidemia) Mother      Hypertension Mother      Other (dyslipidemia) Father      Stroke Father      Skin cancer Other          Allergies  Aspirin, Codeine, Methimazole, Metoprolol, Pneumococcal vaccine, and Propoxyphene n-acetaminophen    Review of Systems  Review of Systems   Constitutional:  Negative for activity change and appetite change.   HENT:  Negative for sinus pressure and sinus pain.    Eyes:  Negative for pain, discharge, redness and itching.   Respiratory:  Negative for shortness of breath.    Cardiovascular:  Negative for chest pain.   Gastrointestinal:  Negative for abdominal distention and abdominal pain.   Endocrine: Negative for polyuria.   Genitourinary:  Negative for dysuria.   Musculoskeletal:  Negative for arthralgias.   Neurological:  Negative for headaches.   Psychiatric/Behavioral:  The patient is not nervous/anxious      Physical Exam  Physical Exam  HENT:      Head: Normocephalic and atraumatic.   Eyes:     "  Extraocular Movements: Extraocular movements intact.      Conjunctiva/sclera: Conjunctivae normal.      Pupils: Pupils are equal, round, and reactive to light.   Cardiovascular:      Pulses: Normal pulses.   Pulmonary:      Effort: Pulmonary effort is normal.   Abdominal:      General: Abdomen is flat.      Palpations: Abdomen is soft.   Musculoskeletal:         General: No deformity.   Skin:     General: Skin is warm and dry.   Neurological:      General: No focal deficit present.      Mental Status: He is alert and oriented to person, place, and time.   Psychiatric:         Mood and Affect: Mood normal.       Last Recorded Vitals  Blood pressure 139/67, pulse 70, temperature 36.2 °C (97.2 °F), temperature source Temporal, resp. rate 16, height 1.651 m (5' 5\"), weight 70.1 kg (154 lb 8.7 oz), SpO2 95%.    Relevant Results        Scheduled medications  aprepitant, 40 mg, oral, Once  cyclopentolate, 1 drop, Right Eye, q5 min  phenylephrine, 1 drop, Right Eye, q5 min  tetracaine, 1 drop, Right Eye, Once      Continuous medications     PRN medications           Assessment/Plan   Principal Problem:    Combined form of age-related cataract, right eye      Patient presents for cataract surgery due to blurry vision, right eye. Will proceed with CEMARIO, OD, operative note to follow.             Ignacio Aguiar MD    " Consent 1/Introductory Paragraph: The rationale for Mohs was explained to the patient and consent was obtained. The risks, benefits and alternatives to therapy were discussed in detail. Specifically, the risks of infection, scarring, bleeding, prolonged wound healing, incomplete removal, allergy to anesthesia, nerve injury and recurrence were addressed. Prior to the procedure, the treatment site was clearly identified and confirmed by the patient. All components of Universal Protocol/PAUSE Rule completed.

## 2024-06-27 NOTE — ANESTHESIA POSTPROCEDURE EVALUATION
Patient: Jaz Marino    Procedure Summary       Date: 06/27/24 Room / Location: [facility] [place] [facility] OR 03 / Virtual [facility] [place] [facility]    Anesthesia Start: 1158 Anesthesia Stop: 1243    Procedure: Phacoemulsification Cataract with Insertion Intraocular Lens (Right: Eye) Diagnosis:       Combined form of age-related cataract, right eye      (Combined form of age-related cataract, right eye [H25.811])    Surgeons: April Vann MD Responsible Provider: Robert Loaiza DO    Anesthesia Type: MAC ASA Status: 3            Anesthesia Type: MAC    Vitals Value Taken Time   /59 06/27/24 1333   Temp 36.2 °C (97.2 °F) 06/27/24 1333   Pulse 67 06/27/24 1333   Resp 16 06/27/24 1333   SpO2 97 % 06/27/24 1333       Anesthesia Post Evaluation    Patient location during evaluation: PACU  Patient participation: complete - patient participated  Level of consciousness: awake  Pain management: satisfactory to patient  Multimodal analgesia pain management approach  Airway patency: patent  Cardiovascular status: acceptable  Respiratory status: acceptable  Hydration status: acceptable  Postoperative Nausea and Vomiting: none    There were no known notable events for this encounter.

## 2024-06-27 NOTE — DISCHARGE INSTRUCTIONS
Surgeon: April Vann MD     Patient name: Jaz Marino    Date of surgery: June 27, 2024        DROP INSTRUCTIONS:    Prednisolone acetate 1% (pink or white cap) - One drop 4 times a day to operative eye    Ofloxacin (tan cap) - One drop 4 times a day to operative eye    Ketorolac (gray cap) - One drop 4 times a day to operative eye    When putting different drops in around the same administration time, please wait 1-2 minutes between drops  Avoid sleeping on side of operative eye  No heavy lifting over 10-15lbs and no bending over  Do not get eye wet, no eye rubbing  Wear sunglasses or glasses during the day and the shield when sleeping at night  Please call immediately if you develop any redness, pain, decreased vision, flashes, or floaters      During office hours (8:30a-4:30p): [phone]  After office hours: [phone]  Hospital : [phone]   Pager: [phone] for Dr. Vigil

## 2024-06-28 ENCOUNTER — APPOINTMENT (OUTPATIENT)
Dept: OPHTHALMOLOGY | Facility: CLINIC | Age: 74
End: 2024-06-28
Payer: MEDICARE

## 2024-06-28 DIAGNOSIS — H52.13 MYOPIA OF BOTH EYES: ICD-10-CM

## 2024-06-28 DIAGNOSIS — H04.203 BILATERAL EPIPHORA: ICD-10-CM

## 2024-06-28 DIAGNOSIS — H52.4 PRESBYOPIA: ICD-10-CM

## 2024-06-28 DIAGNOSIS — H35.342 LAMELLAR MACULAR HOLE OF LEFT EYE: ICD-10-CM

## 2024-06-28 DIAGNOSIS — H52.203 ASTIGMATISM OF BOTH EYES, UNSPECIFIED TYPE: ICD-10-CM

## 2024-06-28 DIAGNOSIS — E07.9 THYROID EYE DISEASE: ICD-10-CM

## 2024-06-28 DIAGNOSIS — H35.373 EPIRETINAL MEMBRANE (ERM) OF BOTH EYES: ICD-10-CM

## 2024-06-28 DIAGNOSIS — H57.89 THYROID EYE DISEASE: ICD-10-CM

## 2024-06-28 DIAGNOSIS — Z96.1 PSEUDOPHAKIA: Primary | ICD-10-CM

## 2024-06-28 DIAGNOSIS — H50.22 HYPOTROPIA OF LEFT EYE: ICD-10-CM

## 2024-06-28 DIAGNOSIS — H50.89 RESTRICTIVE STRABISMUS: ICD-10-CM

## 2024-06-28 PROCEDURE — 1036F TOBACCO NON-USER: CPT | Performed by: OPHTHALMOLOGY

## 2024-06-28 PROCEDURE — 1159F MED LIST DOCD IN RCRD: CPT | Performed by: OPHTHALMOLOGY

## 2024-06-28 PROCEDURE — 1123F ACP DISCUSS/DSCN MKR DOCD: CPT | Performed by: OPHTHALMOLOGY

## 2024-06-28 PROCEDURE — 1160F RVW MEDS BY RX/DR IN RCRD: CPT | Performed by: OPHTHALMOLOGY

## 2024-06-28 PROCEDURE — 99024 POSTOP FOLLOW-UP VISIT: CPT | Performed by: OPHTHALMOLOGY

## 2024-06-28 ASSESSMENT — REFRACTION_WEARINGRX
OD_CYLINDER: -0.25
OS_SPHERE: -4.25
OS_ADD: +2.50
OS_AXIS: 120
OD_SPHERE: -5.75
OD_ADD: +2.50
OS_CYLINDER: -1.50
OD_AXIS: 165

## 2024-06-28 ASSESSMENT — ENCOUNTER SYMPTOMS
CONSTITUTIONAL NEGATIVE: 0
GASTROINTESTINAL NEGATIVE: 0
EYES NEGATIVE: 1
ENDOCRINE NEGATIVE: 0
MUSCULOSKELETAL NEGATIVE: 0
RESPIRATORY NEGATIVE: 0
CARDIOVASCULAR NEGATIVE: 0
NEUROLOGICAL NEGATIVE: 0
ALLERGIC/IMMUNOLOGIC NEGATIVE: 0
HEMATOLOGIC/LYMPHATIC NEGATIVE: 0
PSYCHIATRIC NEGATIVE: 0

## 2024-06-28 ASSESSMENT — VISUAL ACUITY
METHOD: SNELLEN - LINEAR
OD_SC: 20/20

## 2024-06-28 ASSESSMENT — PAIN SCALES - GENERAL
PAINLEVEL_OUTOF10: 0 - NO PAIN
PAINLEVEL_OUTOF10: 0 - NO PAIN

## 2024-06-28 ASSESSMENT — EXTERNAL EXAM - RIGHT EYE: OD_EXAM: NORMAL

## 2024-06-28 ASSESSMENT — TONOMETRY
IOP_METHOD: GOLDMANN APPLANATION
OD_IOP_MMHG: 24

## 2024-06-28 ASSESSMENT — EXTERNAL EXAM - LEFT EYE: OS_EXAM: NORMAL

## 2024-06-28 NOTE — PROGRESS NOTES
Pseudophakia of right eyeZ96.1 - 6/27/24  -Doing well. Good vision. IOP slightly above normal but acceptable.   Prednisolone acetate 1% - 4 times a day  Ofloxacin - 4 times a day  Ketorolac - 4 times a day  No heavy lifting over 10-15 lbs, no bending over, do not get eye wet, no eye rubbing. Wear eye shield at bedtime and sunglasses/glasses during the day. Advised to call if any redness, pain, decreased vision, flashes, floaters. F/u 1 week - refract both eyes (autorefract first). Plan for OCT macula at 1 month postop visit.     Pseudophakia of left eyeZ96.1 - 5/30/24  -Doing well. Good vision. IOP good.  Prednisolone acetate 1% - today is last day     Epiretinal membrane (ERM) of both eyesH35.373  Lamellar hole, left eye  -OCT macula (7/19/23) - SS: 8/10 OD 9/10 OS.  OD: Normal thickness, mild flattening of foveal contour, mild ERM.  Intact IS-OS.  No edema.  OS: Normal thickness, ERM with early lamellar hole/pseudocyst.  Intact IS-OS.  No edema.  261/253.  -Patient saw Dr. Smith 3/11/24 - recommended observation. Guarded visual prognosis with cataract surgery due to macular pathology. Plan for OCT macula at 1 month postop visit.     Bilateral wxhhaubaY12.203  -May use artificial tears PRN. May consider referral to oculoplastic surgeon for evaluation as needed in the future.     Restrictive ruuutgodgyK46.89  Hypotropia of left eyeH50.22  Thyroid eye iwizfrjP71.00  -RUPERTO s/p LIR recession 7.0mm and LMR recession 5.0mm on 10/14/2022 ([facility])   -F/u with Dr. Reina PRN.  May consider prisms as needed in the future if symptomatic diplopia following cataract surgery.    VqplanP58.10  YjovzpbvhtjI13.209  YhthphfwduX99.4  -Current glasses October 2022  -History of RGP wear from age 17 until late 40s. No recent CLW.   -Requests progressives post surgery (scar on nose)  -F/u 1 week - refract both eyes (autorefract first).       No history of refractive surgery.   No FH of glaucoma  (+)FH AMD - older brother (exudative,  receives antiVEGF)

## 2024-06-28 NOTE — PATIENT INSTRUCTIONS
Surgeon: April Vann MD      Patient name: Jaz Marino    Date of visit: June 28, 2024      READING GLASSES: +2.50 or +2.75      right eye    Prednisolone acetate (pink or white cap) - 4 times a day    Ofloxacin (tan cap) - 4 times a day    Ketorolac (gray cap) - 4 times a day      No heavy lifting over 10-15 lbs, no bending over, do not get eye wet, no eye rubbing. Wear eye shield at bedtime and sunglasses/glasses during the day. Please call immediately if you develop any redness, pain, decreased vision, flashes, floaters.       Surgical Scheduler (during office hours): Tracey: [phone]  Office phone (after hours): [phone]-[facility] : [phone]                     Pager: [phone] for Dr. Vigil

## 2024-07-03 ASSESSMENT — EXTERNAL EXAM - RIGHT EYE: OD_EXAM: NORMAL

## 2024-07-03 ASSESSMENT — EXTERNAL EXAM - LEFT EYE: OS_EXAM: NORMAL

## 2024-07-03 NOTE — PROGRESS NOTES
Pseudophakia of right eyeZ96.1 - 6/27/24  -Doing well. Good vision. IOP slightly above normal but acceptable.   Prednisolone acetate 1% - 4/3/2/1 weekly taper  Ofloxacin - 4 times a day x 3 more days, then stop  Ketorolac - 4 times a day x 1 more week, then stop  No heavy lifting over 15-20 lbs, do not get eye wet, no eye rubbing. Discontinue eye shield at bedtime but wear sunglasses/glasses during the day. Advised to call if any redness, pain, decreased vision, flashes, floaters. F/u 6-8 weeks - refract both eyes and dilate right eye, OCT macula.     Pseudophakia of left eyeZ96.1 - 5/30/24  -Doing well. Good vision. IOP good. Off all gtts.     Epiretinal membrane (ERM) of both eyesH35.373  Lamellar hole, left eye  -OCT macula (7/19/23) - SS: 8/10 OD 9/10 OS.  OD: Normal thickness, mild flattening of foveal contour, mild ERM.  Intact IS-OS.  No edema.  OS: Normal thickness, ERM with early lamellar hole/pseudocyst.  Intact IS-OS.  No edema.  261/253.  -Patient saw Dr. Smith 3/11/24 - recommended observation. Guarded visual prognosis with cataract surgery due to macular pathology. Plan for OCT macula at 1 month postop visit.     Bilateral xgadsosqU81.203  -May use artificial tears PRN. May consider referral to oculoplastic surgeon for evaluation as needed in the future.     Restrictive nzojhlutzyR63.89  Hypotropia of left eyeH50.22  Thyroid eye yfwhbwqC14.00  -RUPERTO s/p LIR recession 7.0mm and LMR recession 5.0mm on 10/14/2022 ([facility]ha)   -F/u with Dr. Reina PRN.  May consider prisms as needed in the future if symptomatic diplopia following cataract surgery.    JriwueG40.10  AydffmgpkfaZ19.209  MufyjfumboA59.4  -Current glasses October 2022  -History of RGP wear from age 17 until late 40s. No recent CLW.   -Requests progressives post surgery (scar on nose)  -F/u 6-8 weeks - refract both eyes and dilate right eye, OCT macula.       No history of refractive surgery.   No FH of glaucoma  (+)FH AMD - older brother  (exudative, receives antiVEGF)

## 2024-07-03 NOTE — PATIENT INSTRUCTIONS
Surgeon: April Vann MD                   [phone]      Patient name: Jaz Marino    Date of visit: 7/5/24      right eye    Prednisolone acetate (pink or white cap) - 4 times a day for 1 week, 3 times a day for 1 week, 2 times a day for 1 week, once a day for 1 week, then stop    Ofloxacin (tan cap) - 4 times a day for 3 more days, then stop    Ketorolac (gray cap) - 4 times a day for 1 more week, then stop      No heavy lifting over 15-20 lbs, try not to get eye wet, no eye rubbing. You may stop wearing the eye shield at night. Please continue wearing glasses or sunglasses during the day to protect your eyes. Please call immediately if you develop any redness, pain, decreased vision, flashes, floaters.     Surgical Scheduler (during office hours) - Tracey: [phone]

## 2024-07-05 ENCOUNTER — APPOINTMENT (OUTPATIENT)
Dept: OPHTHALMOLOGY | Facility: CLINIC | Age: 74
End: 2024-07-05
Payer: MEDICARE

## 2024-07-05 DIAGNOSIS — Z96.1 PSEUDOPHAKIA: Primary | ICD-10-CM

## 2024-07-05 DIAGNOSIS — H35.373 EPIRETINAL MEMBRANE (ERM) OF BOTH EYES: ICD-10-CM

## 2024-07-05 DIAGNOSIS — H57.89 THYROID EYE DISEASE: ICD-10-CM

## 2024-07-05 DIAGNOSIS — H52.13 MYOPIA OF BOTH EYES: ICD-10-CM

## 2024-07-05 DIAGNOSIS — H04.203 BILATERAL EPIPHORA: ICD-10-CM

## 2024-07-05 DIAGNOSIS — E07.9 THYROID EYE DISEASE: ICD-10-CM

## 2024-07-05 DIAGNOSIS — H50.22 HYPOTROPIA OF LEFT EYE: ICD-10-CM

## 2024-07-05 DIAGNOSIS — H50.89 RESTRICTIVE STRABISMUS: ICD-10-CM

## 2024-07-05 DIAGNOSIS — H52.203 ASTIGMATISM OF BOTH EYES, UNSPECIFIED TYPE: ICD-10-CM

## 2024-07-05 DIAGNOSIS — H35.342 LAMELLAR MACULAR HOLE OF LEFT EYE: ICD-10-CM

## 2024-07-05 DIAGNOSIS — H52.4 PRESBYOPIA: ICD-10-CM

## 2024-07-05 PROCEDURE — 99024 POSTOP FOLLOW-UP VISIT: CPT | Performed by: OPHTHALMOLOGY

## 2024-07-05 ASSESSMENT — ENCOUNTER SYMPTOMS
PSYCHIATRIC NEGATIVE: 0
HEMATOLOGIC/LYMPHATIC NEGATIVE: 0
CARDIOVASCULAR NEGATIVE: 0
NEUROLOGICAL NEGATIVE: 0
CONSTITUTIONAL NEGATIVE: 0
ALLERGIC/IMMUNOLOGIC NEGATIVE: 0
GASTROINTESTINAL NEGATIVE: 0
ENDOCRINE NEGATIVE: 0
EYES NEGATIVE: 1
MUSCULOSKELETAL NEGATIVE: 0
RESPIRATORY NEGATIVE: 0

## 2024-07-05 ASSESSMENT — REFRACTION_MANIFEST
OS_SPHERE: +0.50
OD_AXIS: 073
OD_SPHERE: -0.25
OS_SPHERE: +0.50
METHOD_AUTOREFRACTION: 1
OD_SPHERE: +0.25
OS_CYLINDER: -1.25
OS_AXIS: 105
OD_CYLINDER: -0.50
OD_AXIS: 075
OD_CYLINDER: -0.25
OD_ADD: +2.75
OS_CYLINDER: -1.25
OS_ADD: +2.75
OS_AXIS: 104

## 2024-07-05 ASSESSMENT — VISUAL ACUITY
OD_SC: 20/20
METHOD: SNELLEN - LINEAR
OS_SC: 20/40
OS_SC+: +2

## 2024-07-05 ASSESSMENT — TONOMETRY
OD_IOP_MMHG: 20
OS_IOP_MMHG: 19
IOP_METHOD: GOLDMANN APPLANATION

## 2024-07-08 DIAGNOSIS — E78.5 HYPERLIPIDEMIA, UNSPECIFIED HYPERLIPIDEMIA TYPE: ICD-10-CM

## 2024-07-08 DIAGNOSIS — I10 PRIMARY HYPERTENSION: ICD-10-CM

## 2024-07-10 RX ORDER — LOSARTAN POTASSIUM AND HYDROCHLOROTHIAZIDE 25; 100 MG/1; MG/1
1 TABLET ORAL DAILY
Qty: 90 TABLET | Refills: 1 | Status: SHIPPED | OUTPATIENT
Start: 2024-07-10

## 2024-07-10 RX ORDER — ATORVASTATIN CALCIUM 40 MG/1
40 TABLET, FILM COATED ORAL DAILY
Qty: 90 TABLET | Refills: 1 | Status: SHIPPED | OUTPATIENT
Start: 2024-07-10

## 2024-08-12 DIAGNOSIS — I10 HYPERTENSION, UNSPECIFIED TYPE: ICD-10-CM

## 2024-08-12 RX ORDER — AMLODIPINE BESYLATE 10 MG/1
10 TABLET ORAL DAILY
Qty: 100 TABLET | Refills: 2 | Status: SHIPPED | OUTPATIENT
Start: 2024-08-12

## 2024-08-17 ASSESSMENT — CUP TO DISC RATIO
OS_RATIO: .3
OD_RATIO: .3

## 2024-08-17 ASSESSMENT — EXTERNAL EXAM - LEFT EYE: OS_EXAM: NORMAL

## 2024-08-17 ASSESSMENT — EXTERNAL EXAM - RIGHT EYE: OD_EXAM: NORMAL

## 2024-08-18 NOTE — PROGRESS NOTES
Pseudophakia of right eyeZ96.1 - 6/27/24  Pseudophakia of left eyeZ96.1 - 5/30/24  -Doing well. Good vision. IOP good. Off all gtts.   -F/u 1 year - refract OU, dilate OU, OCT macula OU.     Epiretinal membrane (ERM) of both eyesH35.373  Lamellar hole, left eye  -OCT macula (8/30/24) - SS: 8/10 OD 8/10 OS.  OD: Normal thickness, mild flattening of foveal contour, mild ERM.  Intact IS-OS.  No edema.  OS: Normal thickness, ERM with early lamellar hole/pseudocyst.  Intact IS-OS.  No edema.  264/258. Stable from 7/19/23.   -Followed by Dr. Smith - recommended observation. Guarded visual prognosis with cataract surgery due to macular pathology.   -F/u 1 year - refract OU, dilate OU, OCT macula OU.     Bilateral qfsrkxjwV60.203  -May use artificial tears PRN. May consider referral to oculoplastic surgeon for evaluation as needed in the future.     Restrictive itqtouctomL77.89  Hypotropia of left eyeH50.22  Thyroid eye ynimcswG93.00  Ptosis, bilateral  -RUPERTO s/p LIR recession 7.0mm and LMR recession 5.0mm on 10/14/2022 ([facility])   -F/u with Dr. Reina PRN.  May consider prisms as needed in the future if symptomatic diplopia following cataract surgery.  -May consider referral to oculoplastic surgeon in the future as needed.     JfpjrbN48.10  DpvpbnurqzaD90.209  LjiwxffhkdW45.4  -Current glasses October 2022  -History of RGP wear from age 17 until late 40s. No recent CLW.   -New Rx given       No history of refractive surgery.   No FH of glaucoma  (+)FH AMD - older brother (exudative, receives antiVEGF)

## 2024-08-29 ENCOUNTER — APPOINTMENT (OUTPATIENT)
Dept: PRIMARY CARE | Facility: CLINIC | Age: 74
End: 2024-08-29
Payer: MEDICARE

## 2024-08-29 VITALS
DIASTOLIC BLOOD PRESSURE: 70 MMHG | HEART RATE: 88 BPM | BODY MASS INDEX: 25.29 KG/M2 | WEIGHT: 152 LBS | SYSTOLIC BLOOD PRESSURE: 122 MMHG

## 2024-08-29 DIAGNOSIS — E78.00 PURE HYPERCHOLESTEROLEMIA: Primary | ICD-10-CM

## 2024-08-29 DIAGNOSIS — E05.00 GRAVES DISEASE: ICD-10-CM

## 2024-08-29 DIAGNOSIS — F41.1 GENERALIZED ANXIETY DISORDER: ICD-10-CM

## 2024-08-29 DIAGNOSIS — I10 BENIGN ESSENTIAL HYPERTENSION: ICD-10-CM

## 2024-08-29 DIAGNOSIS — M85.80 OSTEOPENIA, UNSPECIFIED LOCATION: ICD-10-CM

## 2024-08-29 DIAGNOSIS — R73.09 ABNORMAL BLOOD SUGAR: ICD-10-CM

## 2024-08-29 DIAGNOSIS — Z51.81 THERAPEUTIC DRUG MONITORING: ICD-10-CM

## 2024-08-29 DIAGNOSIS — I10 PRIMARY HYPERTENSION: ICD-10-CM

## 2024-08-29 DIAGNOSIS — I10 HYPERTENSION, UNSPECIFIED TYPE: ICD-10-CM

## 2024-08-29 LAB
AMPHETAMINES UR QL SCN: NORMAL
BARBITURATES UR QL SCN: NORMAL
BZE UR QL SCN: NORMAL
CANNABINOIDS UR QL SCN: NORMAL
CREAT UR-MCNC: 66.2 MG/DL (ref 20–320)
PCP UR QL SCN: NORMAL

## 2024-08-29 PROCEDURE — 3074F SYST BP LT 130 MM HG: CPT | Performed by: FAMILY MEDICINE

## 2024-08-29 PROCEDURE — 80373 DRUG SCREENING TRAMADOL: CPT

## 2024-08-29 PROCEDURE — 80307 DRUG TEST PRSMV CHEM ANLYZR: CPT

## 2024-08-29 PROCEDURE — 1123F ACP DISCUSS/DSCN MKR DOCD: CPT | Performed by: FAMILY MEDICINE

## 2024-08-29 PROCEDURE — 80368 SEDATIVE HYPNOTICS: CPT

## 2024-08-29 PROCEDURE — 82570 ASSAY OF URINE CREATININE: CPT

## 2024-08-29 PROCEDURE — 80361 OPIATES 1 OR MORE: CPT

## 2024-08-29 PROCEDURE — 80358 DRUG SCREENING METHADONE: CPT

## 2024-08-29 PROCEDURE — 80354 DRUG SCREENING FENTANYL: CPT

## 2024-08-29 PROCEDURE — 3078F DIAST BP <80 MM HG: CPT | Performed by: FAMILY MEDICINE

## 2024-08-29 PROCEDURE — 80365 DRUG SCREENING OXYCODONE: CPT

## 2024-08-29 PROCEDURE — 99214 OFFICE O/P EST MOD 30 MIN: CPT | Performed by: FAMILY MEDICINE

## 2024-08-29 PROCEDURE — 80346 BENZODIAZEPINES1-12: CPT

## 2024-08-29 RX ORDER — AMLODIPINE BESYLATE 10 MG/1
10 TABLET ORAL DAILY
Qty: 100 TABLET | Refills: 2 | Status: SHIPPED | OUTPATIENT
Start: 2024-08-29

## 2024-08-29 RX ORDER — LORAZEPAM 0.5 MG/1
0.5 TABLET ORAL EVERY 6 HOURS PRN
Qty: 28 TABLET | Refills: 0 | Status: SHIPPED | OUTPATIENT
Start: 2024-08-29 | End: 2024-09-05

## 2024-08-29 NOTE — PROGRESS NOTES
Subjective     Patient ID: Jaz Marino is a 74 y.o. female who presents for Follow-up.      Last Physical : 1 Years ago     Pt's PMH, PSH, SH, FH , meds and allergies was obtained / reviewed and updated .     Dental visits : Y  Vision issues : N  Hearing issues : N    Immunizations : Y    Diet :  could be better  Exercise:  Weight concerns :     Alcohol: as noted in SH  Tobacco: as noted in SH  Recreational drug use : None/ as noted in SH    Sexually active : Active   Contraception :   Menstrual problems:  Premenopausal/perimenopausal/ postmenopausal:    G:  Parity:  Full term:    Premature:   (s):   Living :  Ab induced:   Ab spontaneous :  Ectopic :   Multiple :    PAP smear :  Mammogram :  Colonoscopy:    Metabolic screening   - Lipid   - Glucose  ======================================================    Visit Vitals  /70   Pulse 88   Wt 68.9 kg (152 lb)   BMI 25.29 kg/m²   Smoking Status Never   BSA 1.78 m²      No LMP recorded.   Here for follow-up of hypertension hyperlipidemia anxiety   Hospital Beds:  1. The beneficiary has a medical condition which requires positioning of the body in ways not feasible with an ordinary bed.   Elevation of the head/ upper body less than 30 degrees does not usually require the use of a hospital bed    2. The beneficiary requires positioning of the body in ways not feasible with the ordinary bed in order to alleviate pain    3. The beneficiary requires the head of the bed to be elevates more than 30 degrees most of the time due to congestive heart failure, chronic pulmonary disease, or problems with aspiration    4. The beneficiary requires traction equipment, which can only be attached to a hospital bed       =====================================    Review of systems:  Constitutional: no chills, no fever and no night sweats.     Eyes: no blurred vision and no eyesight problems.     ENT: no hearing loss, no nasal congestion, no nasal discharge, no  hoarseness and no sore throat.     Cardiovascular: no chest pain, no intermittent leg claudication, no lower extremity edema, no palpitations and no syncope.     Respiratory: no cough, no shortness of breath during exertion, no shortness of breath at rest and no wheezing.     Gastrointestinal: no abdominal pain, no blood in stools, no constipation, no diarrhea, no melena, no nausea, no rectal pain and no vomiting.     Genitourinary: no dysuria, no change in urinary frequency, no urinary hesitancy, no feelings of urinary urgency and no vaginal discharge.     Musculoskeletal: no arthralgias, no back pain and no myalgias.     Integumentary: no new skin lesions and no rashes.     Neurological: no difficulty walking, no headache, no limb weakness, no numbness and no tingling.     Psychiatric: no anxiety, no depression, no anhedonia and no substance use disorders.     Endocrine: no recent weight gain and no recent weight loss.     Hematologic/Lymphatic: no tendency for easy bruising and no swollen glands.   ============================================================    Physical exam :    Constitutional: Alert and in no acute distress. Well developed, well nourished.     Eyes: Normal external exam. Pupils were equal in size, round, reactive to light (PERRL) with normal accommodation and extraocular movements intact (EOMI).     Ears, Nose, Mouth, and Throat: External inspection of ears and nose: Normal.  Otoscopic examination: Normal.      Neck: No neck mass was observed. Supple.     Cardiovascular: Heart rate and rhythm were normal, normal S1 and S2, no gallops, no murmurs and no pericardial rub    Pulmonary: No respiratory distress. Clear bilateral breath sounds.     Abdomen: Soft nontender; no abdominal mass palpated. No organomegaly.     Musculoskeletal: No joint swelling seen, normal movements of all extremities. Range of motion: Normal.  Muscle strength/tone: Normal.      Skin: Normal skin color and pigmentation,  normal skin turgor, and no rash.     Neurologic: Deep tendon reflexes were 2+ and symmetric. Sensation: Normal.     Psychiatric: Judgment and insight: Intact. Mood and affect: Normal.    Lymphatic : Cervical/ axillary/ groin Lns Palpable/ non palpable            All other systems have been reviewed and are negative for complaint.      Assessment/Plan    Problem List Items Addressed This Visit             ICD-10-CM    Abnormal blood sugar R73.09    Relevant Orders    Lipid Panel    CBC    TSH with reflex to Free T4 if abnormal    Hemoglobin A1C    Comprehensive Metabolic Panel    T4, free    T3, free    Benign essential hypertension I10    Relevant Orders    Lipid Panel    CBC    TSH with reflex to Free T4 if abnormal    Hemoglobin A1C    Comprehensive Metabolic Panel    T4, free    T3, free    Hyperlipidemia - Primary E78.5    Relevant Orders    Lipid Panel    CBC    TSH with reflex to Free T4 if abnormal    Hemoglobin A1C    Comprehensive Metabolic Panel    T4, free    T3, free    Graves disease E05.00    Relevant Orders    Lipid Panel    CBC    TSH with reflex to Free T4 if abnormal    Hemoglobin A1C    Comprehensive Metabolic Panel    T4, free    T3, free    Osteopenia M85.80    Relevant Orders    Lipid Panel    CBC    TSH with reflex to Free T4 if abnormal    Hemoglobin A1C    Comprehensive Metabolic Panel    T4, free    T3, free    Generalized anxiety disorder F41.1    Relevant Medications    LORazepam (Ativan) 0.5 mg tablet    Primary hypertension I10    Relevant Orders    Lipid Panel    CBC    TSH with reflex to Free T4 if abnormal    Hemoglobin A1C    Comprehensive Metabolic Panel    T4, free    T3, free    Hypertension I10    Relevant Medications    amLODIPine (Norvasc) 10 mg tablet    Other Relevant Orders    Lipid Panel    CBC    TSH with reflex to Free T4 if abnormal    Hemoglobin A1C    Comprehensive Metabolic Panel    T4, free    T3, free    Therapeutic drug monitoring Z51.81    Relevant Orders     Opiate/Opioid/Benzo Prescription Compliance

## 2024-08-30 ENCOUNTER — APPOINTMENT (OUTPATIENT)
Dept: OPHTHALMOLOGY | Facility: CLINIC | Age: 74
End: 2024-08-30
Payer: MEDICARE

## 2024-08-30 DIAGNOSIS — H50.22 HYPOTROPIA OF LEFT EYE: ICD-10-CM

## 2024-08-30 DIAGNOSIS — H52.13 MYOPIA OF BOTH EYES: ICD-10-CM

## 2024-08-30 DIAGNOSIS — H35.342 LAMELLAR MACULAR HOLE OF LEFT EYE: ICD-10-CM

## 2024-08-30 DIAGNOSIS — Z96.1 PSEUDOPHAKIA: Primary | ICD-10-CM

## 2024-08-30 DIAGNOSIS — H02.403 PTOSIS OF BOTH EYELIDS: ICD-10-CM

## 2024-08-30 DIAGNOSIS — H35.373 EPIRETINAL MEMBRANE (ERM) OF BOTH EYES: ICD-10-CM

## 2024-08-30 DIAGNOSIS — H57.89 THYROID EYE DISEASE: ICD-10-CM

## 2024-08-30 DIAGNOSIS — H52.203 ASTIGMATISM OF BOTH EYES, UNSPECIFIED TYPE: ICD-10-CM

## 2024-08-30 DIAGNOSIS — H04.203 BILATERAL EPIPHORA: ICD-10-CM

## 2024-08-30 DIAGNOSIS — H52.4 PRESBYOPIA: ICD-10-CM

## 2024-08-30 DIAGNOSIS — H50.89 RESTRICTIVE STRABISMUS: ICD-10-CM

## 2024-08-30 DIAGNOSIS — E07.9 THYROID EYE DISEASE: ICD-10-CM

## 2024-08-30 PROCEDURE — 99024 POSTOP FOLLOW-UP VISIT: CPT | Performed by: OPHTHALMOLOGY

## 2024-08-30 ASSESSMENT — REFRACTION_MANIFEST
OD_SPHERE: PLANO
OD_AXIS: 075
OS_ADD: +2.50
OS_SPHERE: +0.25
OS_AXIS: 120
OS_CYLINDER: -0.75
OD_CYLINDER: -0.25
OD_ADD: +2.50

## 2024-08-30 ASSESSMENT — VISUAL ACUITY
OD_SC: 20/20-
OS_SC: 20/40-
METHOD: SNELLEN - LINEAR

## 2024-08-30 ASSESSMENT — REFRACTION_WEARINGRX
OD_SPHERE: -5.75
OD_ADD: +2.50
OD_CYLINDER: -0.25
OS_AXIS: 120
OD_AXIS: 165
OS_CYLINDER: -1.50
OS_ADD: +2.50
OS_SPHERE: -4.25

## 2024-08-30 ASSESSMENT — ENCOUNTER SYMPTOMS
EYES NEGATIVE: 1
RESPIRATORY NEGATIVE: 0
CARDIOVASCULAR NEGATIVE: 0
PSYCHIATRIC NEGATIVE: 0
ENDOCRINE NEGATIVE: 0
MUSCULOSKELETAL NEGATIVE: 0
ALLERGIC/IMMUNOLOGIC NEGATIVE: 0
HEMATOLOGIC/LYMPHATIC NEGATIVE: 0
GASTROINTESTINAL NEGATIVE: 0
NEUROLOGICAL NEGATIVE: 0
CONSTITUTIONAL NEGATIVE: 0

## 2024-08-30 ASSESSMENT — TONOMETRY
OS_IOP_MMHG: 12
IOP_METHOD: GOLDMANN APPLANATION
OD_IOP_MMHG: 14

## 2024-09-03 ENCOUNTER — LAB (OUTPATIENT)
Dept: LAB | Facility: LAB | Age: 74
End: 2024-09-03
Payer: MEDICARE

## 2024-09-03 DIAGNOSIS — E78.00 PURE HYPERCHOLESTEROLEMIA: ICD-10-CM

## 2024-09-03 DIAGNOSIS — E05.00 GRAVES DISEASE: ICD-10-CM

## 2024-09-03 DIAGNOSIS — I10 BENIGN ESSENTIAL HYPERTENSION: ICD-10-CM

## 2024-09-03 DIAGNOSIS — R73.09 ABNORMAL BLOOD SUGAR: ICD-10-CM

## 2024-09-03 DIAGNOSIS — I10 PRIMARY HYPERTENSION: ICD-10-CM

## 2024-09-03 DIAGNOSIS — M85.80 OSTEOPENIA, UNSPECIFIED LOCATION: ICD-10-CM

## 2024-09-03 LAB
ALBUMIN SERPL BCP-MCNC: 4.4 G/DL (ref 3.4–5)
ALP SERPL-CCNC: 84 U/L (ref 33–136)
ALT SERPL W P-5'-P-CCNC: 24 U/L (ref 7–45)
ANION GAP SERPL CALC-SCNC: 15 MMOL/L (ref 10–20)
AST SERPL W P-5'-P-CCNC: 23 U/L (ref 9–39)
BILIRUB SERPL-MCNC: 0.5 MG/DL (ref 0–1.2)
BUN SERPL-MCNC: 17 MG/DL (ref 6–23)
CALCIUM SERPL-MCNC: 9.3 MG/DL (ref 8.6–10.6)
CHLORIDE SERPL-SCNC: 103 MMOL/L (ref 98–107)
CHOLEST SERPL-MCNC: 175 MG/DL (ref 0–199)
CHOLESTEROL/HDL RATIO: 4
CO2 SERPL-SCNC: 25 MMOL/L (ref 21–32)
CREAT SERPL-MCNC: 0.65 MG/DL (ref 0.5–1.05)
EGFRCR SERPLBLD CKD-EPI 2021: >90 ML/MIN/1.73M*2
ERYTHROCYTE [DISTWIDTH] IN BLOOD BY AUTOMATED COUNT: 13.2 % (ref 11.5–14.5)
EST. AVERAGE GLUCOSE BLD GHB EST-MCNC: 120 MG/DL
GLUCOSE SERPL-MCNC: 99 MG/DL (ref 74–99)
HBA1C MFR BLD: 5.8 %
HCT VFR BLD AUTO: 39.7 % (ref 36–46)
HDLC SERPL-MCNC: 43.9 MG/DL
HGB BLD-MCNC: 13.2 G/DL (ref 12–16)
LDLC SERPL CALC-MCNC: 94 MG/DL
MCH RBC QN AUTO: 29.1 PG (ref 26–34)
MCHC RBC AUTO-ENTMCNC: 33.2 G/DL (ref 32–36)
MCV RBC AUTO: 87 FL (ref 80–100)
NON HDL CHOLESTEROL: 131 MG/DL (ref 0–149)
NRBC BLD-RTO: 0 /100 WBCS (ref 0–0)
PLATELET # BLD AUTO: 339 X10*3/UL (ref 150–450)
POTASSIUM SERPL-SCNC: 3.9 MMOL/L (ref 3.5–5.3)
PROT SERPL-MCNC: 7.5 G/DL (ref 6.4–8.2)
RBC # BLD AUTO: 4.54 X10*6/UL (ref 4–5.2)
SODIUM SERPL-SCNC: 139 MMOL/L (ref 136–145)
T3FREE SERPL-MCNC: 3.4 PG/ML (ref 2.3–4.2)
T4 FREE SERPL-MCNC: 1.19 NG/DL (ref 0.78–1.48)
TRIGL SERPL-MCNC: 184 MG/DL (ref 0–149)
TSH SERPL-ACNC: 1.46 MIU/L (ref 0.44–3.98)
VLDL: 37 MG/DL (ref 0–40)
WBC # BLD AUTO: 8.1 X10*3/UL (ref 4.4–11.3)

## 2024-09-03 PROCEDURE — 84481 FREE ASSAY (FT-3): CPT

## 2024-09-03 PROCEDURE — 84439 ASSAY OF FREE THYROXINE: CPT

## 2024-09-03 PROCEDURE — 84443 ASSAY THYROID STIM HORMONE: CPT

## 2024-09-03 PROCEDURE — 80053 COMPREHEN METABOLIC PANEL: CPT

## 2024-09-03 PROCEDURE — 36415 COLL VENOUS BLD VENIPUNCTURE: CPT

## 2024-09-03 PROCEDURE — 85027 COMPLETE CBC AUTOMATED: CPT

## 2024-09-03 PROCEDURE — 83036 HEMOGLOBIN GLYCOSYLATED A1C: CPT

## 2024-09-03 PROCEDURE — 80061 LIPID PANEL: CPT

## 2024-09-06 ENCOUNTER — TELEPHONE (OUTPATIENT)
Dept: PRIMARY CARE | Facility: CLINIC | Age: 74
End: 2024-09-06

## 2024-09-06 ENCOUNTER — APPOINTMENT (OUTPATIENT)
Dept: ENDOCRINOLOGY | Facility: CLINIC | Age: 74
End: 2024-09-06
Payer: MEDICARE

## 2024-09-06 VITALS
BODY MASS INDEX: 25.9 KG/M2 | SYSTOLIC BLOOD PRESSURE: 120 MMHG | WEIGHT: 155.42 LBS | DIASTOLIC BLOOD PRESSURE: 70 MMHG | RESPIRATION RATE: 16 BRPM | HEART RATE: 68 BPM | HEIGHT: 65 IN

## 2024-09-06 DIAGNOSIS — E05.00 GRAVES DISEASE: Primary | ICD-10-CM

## 2024-09-06 PROCEDURE — 3074F SYST BP LT 130 MM HG: CPT | Performed by: INTERNAL MEDICINE

## 2024-09-06 PROCEDURE — 1159F MED LIST DOCD IN RCRD: CPT | Performed by: INTERNAL MEDICINE

## 2024-09-06 PROCEDURE — 1160F RVW MEDS BY RX/DR IN RCRD: CPT | Performed by: INTERNAL MEDICINE

## 2024-09-06 PROCEDURE — 99213 OFFICE O/P EST LOW 20 MIN: CPT | Performed by: INTERNAL MEDICINE

## 2024-09-06 PROCEDURE — 1123F ACP DISCUSS/DSCN MKR DOCD: CPT | Performed by: INTERNAL MEDICINE

## 2024-09-06 PROCEDURE — 3078F DIAST BP <80 MM HG: CPT | Performed by: INTERNAL MEDICINE

## 2024-09-06 PROCEDURE — 1036F TOBACCO NON-USER: CPT | Performed by: INTERNAL MEDICINE

## 2024-09-06 ASSESSMENT — ENCOUNTER SYMPTOMS
COUGH: 0
CHILLS: 0
VOMITING: 0
HEADACHES: 0
DIARRHEA: 0
PALPITATIONS: 0
NAUSEA: 0
FEVER: 0
SHORTNESS OF BREATH: 0
FATIGUE: 0

## 2024-09-06 NOTE — PROGRESS NOTES
Endocrinology: Follow up visit  Subjective   Patient ID: Jaz Marino is a 74 y.o. female who presents for Hyperthyroidism and Graves' Disease.    PCP: Edy Whatley MD    HPI  Graves with orbitopathy on methimazole 19'-22'  Doing well   No sxs of hyperthyroidism  Had recent cataract surgery that went ok    Review of Systems   Constitutional:  Negative for chills, fatigue and fever.   Respiratory:  Negative for cough and shortness of breath.    Cardiovascular:  Negative for chest pain and palpitations.   Gastrointestinal:  Negative for diarrhea, nausea and vomiting.   Neurological:  Negative for headaches.       Patient Active Problem List   Diagnosis    Abnormal blood sugar    Abnormal finding on mammography    Allergic rhinitis    Allergy to clam    Astigmatism of both eyes    Basal cell carcinoma    Benign essential hypertension    Bilateral epiphora    Bilateral presbyopia    Candidal dermatitis    Diplopia    Hyperlipidemia    Graves disease    Thyroid eye disease    Fatigue    Hemorrhoids    Myopia of both eyes    History of migraine headaches    Hyperthyroidism    Hypotropia of left eye    Nuclear sclerosis    Osteopenia    Other disorders of intestinal carbohydrate absorption    Restrictive strabismus    Rotator cuff tendinitis, right    Situational anxiety    SOB (shortness of breath)    Thyroid function test abnormal    Combined form of age-related cataract, left eye    Combined form of age-related cataract, right eye    Epiretinal membrane (ERM) of both eyes    Routine general medical examination at a health care facility    Encounter for therapeutic drug level monitoring    Generalized anxiety disorder    Lamellar macular hole of left eye    Visit for screening mammogram    Ear pain, right    Routine general medical examination at health care facility    Primary hypertension    Presbyopia    PONV (postoperative nausea and vomiting)    Pseudophakia    Hypertension    Therapeutic drug monitoring     Ptosis of both eyelids        Home Meds:  Current Outpatient Medications   Medication Instructions    amLODIPine (NORVASC) 10 mg, oral, Daily    atorvastatin (LIPITOR) 40 mg, oral, Daily    ketorolac (Acular) 0.5 % ophthalmic solution 1 drop to surgical eye 4 times a day starting 1 day before surgery    levocetirizine (XYZAL) 5 mg, oral, Every evening    LORazepam (ATIVAN) 0.5 mg, oral, Every 6 hours PRN    losartan-hydrochlorothiazide (Hyzaar) 100-25 mg tablet 1 tablet, oral, Daily    ofloxacin (Ocuflox) 0.3 % ophthalmic solution 1 drop to operative eye 4 times a day starting 1 day before surgery    prednisoLONE acetate (Pred-Forte) 1 % ophthalmic suspension 1 drop to operative eye 4 times a day starting the day of surgery (after surgery is done)        Allergies   Allergen Reactions    Aspirin Unknown    Codeine Other    Methimazole Unknown    Metoprolol Unknown    Pneumococcal Vaccine Other    Propoxyphene N-Acetaminophen Other        Objective   Vitals:    09/06/24 1123   BP: 120/70   Pulse: 68   Resp: 16      Vitals:    09/06/24 1123   Weight: 70.5 kg (155 lb 6.8 oz)      Body mass index is 25.86 kg/m².   Physical Exam  Constitutional:       Appearance: Normal appearance. She is overweight.   HENT:      Head: Normocephalic and atraumatic.   Neck:      Thyroid: No thyroid mass, thyromegaly or thyroid tenderness.   Cardiovascular:      Rate and Rhythm: Normal rate and regular rhythm.      Heart sounds: No murmur heard.     No gallop.   Pulmonary:      Effort: Pulmonary effort is normal.      Breath sounds: Normal breath sounds.   Abdominal:      Palpations: Abdomen is soft.      Comments: benign   Neurological:      General: No focal deficit present.      Mental Status: She is alert and oriented to person, place, and time.      Deep Tendon Reflexes: Reflexes are normal and symmetric.   Psychiatric:         Behavior: Behavior is cooperative.         Labs:  Lab Results   Component Value Date    HGBA1C 5.8 (H)  09/03/2024    TSH 1.46 09/03/2024    FREET4 1.19 09/03/2024      Lab Results   Component Value Date    TSI <1.0 11/28/2022        Assessment/Plan   Assessment & Plan  Graves disease    Discussed course  Doing great with no sign of recurrence  Ok to follow up in one year        Electronically signed by:  Pura Casas MD 09/06/24 11:50 AM

## 2024-10-15 DIAGNOSIS — E78.5 HYPERLIPIDEMIA, UNSPECIFIED HYPERLIPIDEMIA TYPE: ICD-10-CM

## 2024-10-15 DIAGNOSIS — I10 PRIMARY HYPERTENSION: ICD-10-CM

## 2024-10-15 RX ORDER — LOSARTAN POTASSIUM AND HYDROCHLOROTHIAZIDE 25; 100 MG/1; MG/1
1 TABLET ORAL DAILY
Qty: 100 TABLET | Refills: 0 | Status: SHIPPED | OUTPATIENT
Start: 2024-10-15

## 2024-10-15 RX ORDER — ATORVASTATIN CALCIUM 40 MG/1
40 TABLET, FILM COATED ORAL DAILY
Qty: 100 TABLET | Refills: 0 | Status: SHIPPED | OUTPATIENT
Start: 2024-10-15

## 2024-10-28 ENCOUNTER — OFFICE VISIT (OUTPATIENT)
Dept: PRIMARY CARE | Facility: CLINIC | Age: 74
End: 2024-10-28
Payer: MEDICARE

## 2024-10-28 VITALS
WEIGHT: 153.4 LBS | SYSTOLIC BLOOD PRESSURE: 134 MMHG | BODY MASS INDEX: 25.56 KG/M2 | DIASTOLIC BLOOD PRESSURE: 78 MMHG | HEART RATE: 69 BPM | HEIGHT: 65 IN

## 2024-10-28 DIAGNOSIS — E78.00 PURE HYPERCHOLESTEROLEMIA: ICD-10-CM

## 2024-10-28 DIAGNOSIS — I10 PRIMARY HYPERTENSION: ICD-10-CM

## 2024-10-28 DIAGNOSIS — I10 BENIGN ESSENTIAL HYPERTENSION: ICD-10-CM

## 2024-10-28 DIAGNOSIS — R42 VERTIGO: Primary | ICD-10-CM

## 2024-10-28 PROCEDURE — 3075F SYST BP GE 130 - 139MM HG: CPT | Performed by: FAMILY MEDICINE

## 2024-10-28 PROCEDURE — 1123F ACP DISCUSS/DSCN MKR DOCD: CPT | Performed by: FAMILY MEDICINE

## 2024-10-28 PROCEDURE — G2211 COMPLEX E/M VISIT ADD ON: HCPCS | Performed by: FAMILY MEDICINE

## 2024-10-28 PROCEDURE — 99214 OFFICE O/P EST MOD 30 MIN: CPT | Performed by: FAMILY MEDICINE

## 2024-10-28 PROCEDURE — 1159F MED LIST DOCD IN RCRD: CPT | Performed by: FAMILY MEDICINE

## 2024-10-28 PROCEDURE — 3078F DIAST BP <80 MM HG: CPT | Performed by: FAMILY MEDICINE

## 2024-10-28 RX ORDER — METHYLPREDNISOLONE 4 MG/1
TABLET ORAL
Qty: 21 TABLET | Refills: 0 | Status: SHIPPED | OUTPATIENT
Start: 2024-10-28 | End: 2024-11-04

## 2024-10-28 RX ORDER — MECLIZINE HYDROCHLORIDE 25 MG/1
25 TABLET ORAL 3 TIMES DAILY PRN
Qty: 30 TABLET | Refills: 0 | Status: SHIPPED | OUTPATIENT
Start: 2024-10-28 | End: 2025-10-28

## 2024-10-28 ASSESSMENT — ENCOUNTER SYMPTOMS
OCCASIONAL FEELINGS OF UNSTEADINESS: 0
LOSS OF SENSATION IN FEET: 0
DEPRESSION: 0

## 2024-11-03 PROBLEM — R42 VERTIGO: Status: ACTIVE | Noted: 2024-11-03

## 2024-11-03 PROBLEM — I10 HYPERTENSION: Status: RESOLVED | Noted: 2024-08-29 | Resolved: 2024-11-03

## 2024-11-03 ASSESSMENT — ENCOUNTER SYMPTOMS
FATIGUE: 1
DIZZINESS: 1
NAUSEA: 1

## 2025-02-04 DIAGNOSIS — I10 PRIMARY HYPERTENSION: ICD-10-CM

## 2025-02-04 RX ORDER — LOSARTAN POTASSIUM AND HYDROCHLOROTHIAZIDE 25; 100 MG/1; MG/1
1 TABLET ORAL DAILY
Qty: 90 TABLET | OUTPATIENT
Start: 2025-02-04

## 2025-02-05 DIAGNOSIS — I10 PRIMARY HYPERTENSION: ICD-10-CM

## 2025-02-05 RX ORDER — LOSARTAN POTASSIUM AND HYDROCHLOROTHIAZIDE 25; 100 MG/1; MG/1
1 TABLET ORAL DAILY
Qty: 100 TABLET | Refills: 0 | Status: SHIPPED | OUTPATIENT
Start: 2025-02-05

## 2025-02-27 ENCOUNTER — APPOINTMENT (OUTPATIENT)
Dept: PRIMARY CARE | Facility: CLINIC | Age: 75
End: 2025-02-27
Payer: MEDICARE

## 2025-03-03 ENCOUNTER — APPOINTMENT (OUTPATIENT)
Dept: PRIMARY CARE | Facility: CLINIC | Age: 75
End: 2025-03-03
Payer: MEDICARE

## 2025-03-03 ENCOUNTER — OFFICE VISIT (OUTPATIENT)
Dept: PRIMARY CARE | Facility: CLINIC | Age: 75
End: 2025-03-03
Payer: MEDICARE

## 2025-03-03 VITALS
HEART RATE: 80 BPM | SYSTOLIC BLOOD PRESSURE: 138 MMHG | BODY MASS INDEX: 26.26 KG/M2 | DIASTOLIC BLOOD PRESSURE: 70 MMHG | WEIGHT: 157.6 LBS | HEIGHT: 65 IN

## 2025-03-03 DIAGNOSIS — Z12.31 VISIT FOR SCREENING MAMMOGRAM: ICD-10-CM

## 2025-03-03 DIAGNOSIS — F41.8 SITUATIONAL ANXIETY: ICD-10-CM

## 2025-03-03 DIAGNOSIS — J30.9 ALLERGIC RHINITIS, UNSPECIFIED SEASONALITY, UNSPECIFIED TRIGGER: ICD-10-CM

## 2025-03-03 DIAGNOSIS — E05.00 GRAVES DISEASE: ICD-10-CM

## 2025-03-03 DIAGNOSIS — F41.1 GENERALIZED ANXIETY DISORDER: ICD-10-CM

## 2025-03-03 DIAGNOSIS — R73.09 ABNORMAL BLOOD SUGAR: ICD-10-CM

## 2025-03-03 DIAGNOSIS — R10.13 EPIGASTRIC PAIN: ICD-10-CM

## 2025-03-03 DIAGNOSIS — E78.5 HYPERLIPIDEMIA, UNSPECIFIED HYPERLIPIDEMIA TYPE: ICD-10-CM

## 2025-03-03 DIAGNOSIS — I10 BENIGN ESSENTIAL HYPERTENSION: Primary | ICD-10-CM

## 2025-03-03 DIAGNOSIS — Z00.00 ROUTINE GENERAL MEDICAL EXAMINATION AT HEALTH CARE FACILITY: ICD-10-CM

## 2025-03-03 DIAGNOSIS — I10 PRIMARY HYPERTENSION: ICD-10-CM

## 2025-03-03 DIAGNOSIS — R42 VERTIGO: ICD-10-CM

## 2025-03-03 DIAGNOSIS — R10.32 LLQ PAIN: ICD-10-CM

## 2025-03-03 PROCEDURE — G0439 PPPS, SUBSEQ VISIT: HCPCS | Performed by: FAMILY MEDICINE

## 2025-03-03 PROCEDURE — 1036F TOBACCO NON-USER: CPT | Performed by: FAMILY MEDICINE

## 2025-03-03 PROCEDURE — 1160F RVW MEDS BY RX/DR IN RCRD: CPT | Performed by: FAMILY MEDICINE

## 2025-03-03 PROCEDURE — 1170F FXNL STATUS ASSESSED: CPT | Performed by: FAMILY MEDICINE

## 2025-03-03 PROCEDURE — 3078F DIAST BP <80 MM HG: CPT | Performed by: FAMILY MEDICINE

## 2025-03-03 PROCEDURE — 3075F SYST BP GE 130 - 139MM HG: CPT | Performed by: FAMILY MEDICINE

## 2025-03-03 PROCEDURE — G0444 DEPRESSION SCREEN ANNUAL: HCPCS | Performed by: FAMILY MEDICINE

## 2025-03-03 PROCEDURE — 99214 OFFICE O/P EST MOD 30 MIN: CPT | Performed by: FAMILY MEDICINE

## 2025-03-03 PROCEDURE — 1159F MED LIST DOCD IN RCRD: CPT | Performed by: FAMILY MEDICINE

## 2025-03-03 PROCEDURE — G0446 INTENS BEHAVE THER CARDIO DX: HCPCS | Performed by: FAMILY MEDICINE

## 2025-03-03 PROCEDURE — 1123F ACP DISCUSS/DSCN MKR DOCD: CPT | Performed by: FAMILY MEDICINE

## 2025-03-03 RX ORDER — LEVOCETIRIZINE DIHYDROCHLORIDE 5 MG/1
5 TABLET, FILM COATED ORAL EVERY EVENING
Qty: 90 TABLET | Refills: 1 | Status: SHIPPED | OUTPATIENT
Start: 2025-03-03

## 2025-03-03 RX ORDER — MECLIZINE HCL 12.5 MG 12.5 MG/1
12.5 TABLET ORAL 3 TIMES DAILY PRN
Qty: 45 TABLET | Refills: 0 | Status: SHIPPED | OUTPATIENT
Start: 2025-03-03 | End: 2025-03-18

## 2025-03-03 RX ORDER — LORAZEPAM 0.5 MG/1
0.5 TABLET ORAL EVERY 6 HOURS PRN
Qty: 28 TABLET | Refills: 0 | Status: SHIPPED | OUTPATIENT
Start: 2025-03-03 | End: 2025-03-10

## 2025-03-03 RX ORDER — MECLIZINE HYDROCHLORIDE 25 MG/1
25 TABLET ORAL 3 TIMES DAILY PRN
Qty: 30 TABLET | Refills: 0 | Status: CANCELLED | OUTPATIENT
Start: 2025-03-03 | End: 2026-03-03

## 2025-03-03 RX ORDER — ATORVASTATIN CALCIUM 40 MG/1
40 TABLET, FILM COATED ORAL DAILY
Qty: 90 TABLET | Refills: 1 | Status: SHIPPED | OUTPATIENT
Start: 2025-03-03 | End: 2026-03-03

## 2025-03-03 RX ORDER — OMEPRAZOLE 40 MG/1
40 CAPSULE, DELAYED RELEASE ORAL
Qty: 30 CAPSULE | Refills: 1 | Status: SHIPPED | OUTPATIENT
Start: 2025-03-03 | End: 2025-05-02

## 2025-03-03 ASSESSMENT — PATIENT HEALTH QUESTIONNAIRE - PHQ9
2. FEELING DOWN, DEPRESSED OR HOPELESS: NOT AT ALL
1. LITTLE INTEREST OR PLEASURE IN DOING THINGS: NOT AT ALL
2. FEELING DOWN, DEPRESSED OR HOPELESS: NOT AT ALL
SUM OF ALL RESPONSES TO PHQ9 QUESTIONS 1 AND 2: 0
SUM OF ALL RESPONSES TO PHQ9 QUESTIONS 1 AND 2: 0
1. LITTLE INTEREST OR PLEASURE IN DOING THINGS: NOT AT ALL

## 2025-03-03 ASSESSMENT — ACTIVITIES OF DAILY LIVING (ADL)
DOING_HOUSEWORK: INDEPENDENT
DRESSING: INDEPENDENT
MANAGING_FINANCES: INDEPENDENT
GROCERY_SHOPPING: INDEPENDENT
TAKING_MEDICATION: INDEPENDENT
BATHING: INDEPENDENT

## 2025-03-03 ASSESSMENT — ENCOUNTER SYMPTOMS
OCCASIONAL FEELINGS OF UNSTEADINESS: 0
LOSS OF SENSATION IN FEET: 0
DEPRESSION: 0

## 2025-03-05 LAB
ALBUMIN SERPL-MCNC: 4.4 G/DL (ref 3.6–5.1)
ALP SERPL-CCNC: 76 U/L (ref 37–153)
ALT SERPL-CCNC: 17 U/L (ref 6–29)
AMYLASE SERPL-CCNC: 61 U/L (ref 21–101)
ANION GAP SERPL CALCULATED.4IONS-SCNC: 11 MMOL/L (CALC) (ref 7–17)
AST SERPL-CCNC: 18 U/L (ref 10–35)
BILIRUB SERPL-MCNC: 0.5 MG/DL (ref 0.2–1.2)
BUN SERPL-MCNC: 18 MG/DL (ref 7–25)
CALCIUM SERPL-MCNC: 9 MG/DL (ref 8.6–10.4)
CHLORIDE SERPL-SCNC: 104 MMOL/L (ref 98–110)
CHOLEST SERPL-MCNC: 165 MG/DL
CHOLEST/HDLC SERPL: 3.7 (CALC)
CO2 SERPL-SCNC: 25 MMOL/L (ref 20–32)
CREAT SERPL-MCNC: 0.71 MG/DL (ref 0.6–1)
EGFRCR SERPLBLD CKD-EPI 2021: 88 ML/MIN/1.73M2
ERYTHROCYTE [DISTWIDTH] IN BLOOD BY AUTOMATED COUNT: 12.1 % (ref 11–15)
EST. AVERAGE GLUCOSE BLD GHB EST-MCNC: 126 MG/DL
EST. AVERAGE GLUCOSE BLD GHB EST-SCNC: 7 MMOL/L
GLUCOSE SERPL-MCNC: 97 MG/DL (ref 65–99)
HBA1C MFR BLD: 6 % OF TOTAL HGB
HCT VFR BLD AUTO: 40.2 % (ref 35–45)
HDLC SERPL-MCNC: 45 MG/DL
HGB BLD-MCNC: 13.1 G/DL (ref 11.7–15.5)
LDLC SERPL CALC-MCNC: 96 MG/DL (CALC)
LIPASE SERPL-CCNC: 27 U/L (ref 7–60)
MCH RBC QN AUTO: 29.8 PG (ref 27–33)
MCHC RBC AUTO-ENTMCNC: 32.6 G/DL (ref 32–36)
MCV RBC AUTO: 91.4 FL (ref 80–100)
NONHDLC SERPL-MCNC: 120 MG/DL (CALC)
PLATELET # BLD AUTO: 347 THOUSAND/UL (ref 140–400)
PMV BLD REES-ECKER: 10.7 FL (ref 7.5–12.5)
POTASSIUM SERPL-SCNC: 3.7 MMOL/L (ref 3.5–5.3)
PROT SERPL-MCNC: 7.1 G/DL (ref 6.1–8.1)
RBC # BLD AUTO: 4.4 MILLION/UL (ref 3.8–5.1)
SODIUM SERPL-SCNC: 140 MMOL/L (ref 135–146)
TRIGL SERPL-MCNC: 141 MG/DL
TSH SERPL-ACNC: 1.81 MIU/L (ref 0.4–4.5)
WBC # BLD AUTO: 10.7 THOUSAND/UL (ref 3.8–10.8)

## 2025-03-09 PROBLEM — R10.13 EPIGASTRIC PAIN: Status: ACTIVE | Noted: 2025-03-09

## 2025-03-09 PROBLEM — R10.32 LLQ PAIN: Status: ACTIVE | Noted: 2025-03-09

## 2025-03-09 ASSESSMENT — ENCOUNTER SYMPTOMS
CONSTIPATION: 0
BLOOD IN STOOL: 0
FEVER: 0
NAUSEA: 1
MUSCULOSKELETAL NEGATIVE: 1
DIARRHEA: 0
ABDOMINAL PAIN: 1
RESPIRATORY NEGATIVE: 1
ENDOCRINE NEGATIVE: 1
NEUROLOGICAL NEGATIVE: 1
PSYCHIATRIC NEGATIVE: 1
ALLERGIC/IMMUNOLOGIC NEGATIVE: 1
HEMATOLOGIC/LYMPHATIC NEGATIVE: 1
CARDIOVASCULAR NEGATIVE: 1
VOMITING: 0
CHILLS: 0

## 2025-03-09 NOTE — PROGRESS NOTES
"Subjective   Reason for Visit: Jaz Marino is an 74 y.o. female here for a Medicare Wellness visit.     Past Medical, Surgical, and Family History reviewed and updated in chart.    Reviewed all medications by prescribing practitioner or clinical pharmacist (such as prescriptions, OTCs, herbal therapies and supplements) and documented in the medical record.    HPI patient presents with a new complaint today has had severe epigastric pain which is worse at night associated with nausea has a burning sensation has taken Tums which did not help the pain does not eat spicy or greasy does not eat late at night no other change in medication has been was sick so therefore patient was in distress  HTN bp controlled  HLD taking and bethany statin  Hyperthyroid stable sees endo    Patient Care Team:  Edy Whatley MD as PCP - General  Edy Whatley MD as PCP - [employer] PCP     Review of Systems   Constitutional:  Negative for chills and fever.   HENT: Negative.     Respiratory: Negative.     Cardiovascular: Negative.    Gastrointestinal:  Positive for abdominal pain and nausea. Negative for blood in stool, constipation, diarrhea and vomiting.   Endocrine: Negative.    Genitourinary: Negative.    Musculoskeletal: Negative.    Skin: Negative.  Negative for rash.   Allergic/Immunologic: Negative.    Neurological: Negative.    Hematological: Negative.    Psychiatric/Behavioral: Negative.     All other systems reviewed and are negative.      Objective   Vitals:  /70   Pulse 80   Ht 1.651 m (5' 5\")   Wt 71.5 kg (157 lb 9.6 oz)   BMI 26.23 kg/m²       Physical Exam  Vitals and nursing note reviewed.   Constitutional:       General: She is not in acute distress.     Appearance: Normal appearance.   HENT:      Head: Normocephalic and atraumatic.      Right Ear: Tympanic membrane normal.      Left Ear: Tympanic membrane normal.      Nose: Nose normal.      Mouth/Throat:      Mouth: Mucous membranes are " moist.   Eyes:      Extraocular Movements: Extraocular movements intact.      Pupils: Pupils are equal, round, and reactive to light.   Cardiovascular:      Rate and Rhythm: Normal rate and regular rhythm.      Pulses: Normal pulses.      Heart sounds: Normal heart sounds.   Pulmonary:      Effort: Pulmonary effort is normal.      Breath sounds: Normal breath sounds.   Abdominal:      Comments: Tenderness rebound and guarding of the epigastrium   Musculoskeletal:         General: Normal range of motion.      Cervical back: Normal range of motion and neck supple. No rigidity.   Skin:     General: Skin is warm and dry.      Capillary Refill: Capillary refill takes less than 2 seconds.      Findings: No rash.   Neurological:      General: No focal deficit present.      Mental Status: She is alert and oriented to person, place, and time.   Psychiatric:         Mood and Affect: Mood normal.         Thought Content: Thought content normal.         Assessment/Plan   Problem List Items Addressed This Visit       Abnormal blood sugar    Relevant Medications    meclizine (Antivert) 12.5 mg tablet    omeprazole (PriLOSEC) 40 mg DR capsule    Other Relevant Orders    Lipid Panel (Completed)    CBC (Completed)    TSH with reflex to Free T4 if abnormal (Completed)    Hemoglobin A1C (Completed)    Comprehensive Metabolic Panel (Completed)    Amylase (Completed)    Lipase (Completed)    Allergic rhinitis    Relevant Medications    levocetirizine (Xyzal) 5 mg tablet    meclizine (Antivert) 12.5 mg tablet    omeprazole (PriLOSEC) 40 mg DR capsule    Other Relevant Orders    Lipid Panel (Completed)    CBC (Completed)    TSH with reflex to Free T4 if abnormal (Completed)    Hemoglobin A1C (Completed)    Comprehensive Metabolic Panel (Completed)    Amylase (Completed)    Lipase (Completed)    Benign essential hypertension - Primary    Relevant Medications    meclizine (Antivert) 12.5 mg tablet    omeprazole (PriLOSEC) 40 mg DR capsule     Other Relevant Orders    Lipid Panel (Completed)    CBC (Completed)    TSH with reflex to Free T4 if abnormal (Completed)    Hemoglobin A1C (Completed)    Comprehensive Metabolic Panel (Completed)    Amylase (Completed)    Lipase (Completed)    Hyperlipidemia    Relevant Medications    atorvastatin (Lipitor) 40 mg tablet    meclizine (Antivert) 12.5 mg tablet    omeprazole (PriLOSEC) 40 mg DR capsule    Other Relevant Orders    Lipid Panel (Completed)    CBC (Completed)    TSH with reflex to Free T4 if abnormal (Completed)    Hemoglobin A1C (Completed)    Comprehensive Metabolic Panel (Completed)    Amylase (Completed)    Lipase (Completed)    Graves disease    Relevant Medications    meclizine (Antivert) 12.5 mg tablet    omeprazole (PriLOSEC) 40 mg DR capsule    Other Relevant Orders    Lipid Panel (Completed)    CBC (Completed)    TSH with reflex to Free T4 if abnormal (Completed)    Hemoglobin A1C (Completed)    Comprehensive Metabolic Panel (Completed)    Amylase (Completed)    Lipase (Completed)    Situational anxiety    Relevant Medications    meclizine (Antivert) 12.5 mg tablet    omeprazole (PriLOSEC) 40 mg DR capsule    Other Relevant Orders    Lipid Panel (Completed)    CBC (Completed)    TSH with reflex to Free T4 if abnormal (Completed)    Hemoglobin A1C (Completed)    Comprehensive Metabolic Panel (Completed)    Amylase (Completed)    Lipase (Completed)    Generalized anxiety disorder    Relevant Medications    LORazepam (Ativan) 0.5 mg tablet    meclizine (Antivert) 12.5 mg tablet    omeprazole (PriLOSEC) 40 mg DR capsule    Other Relevant Orders    Lipid Panel (Completed)    CBC (Completed)    TSH with reflex to Free T4 if abnormal (Completed)    Hemoglobin A1C (Completed)    Comprehensive Metabolic Panel (Completed)    Amylase (Completed)    Lipase (Completed)    Visit for screening mammogram    Relevant Orders    BI mammo bilateral screening tomosynthesis    Primary hypertension    Relevant  Medications    meclizine (Antivert) 12.5 mg tablet    omeprazole (PriLOSEC) 40 mg DR capsule    Other Relevant Orders    Lipid Panel (Completed)    CBC (Completed)    TSH with reflex to Free T4 if abnormal (Completed)    Hemoglobin A1C (Completed)    Comprehensive Metabolic Panel (Completed)    Amylase (Completed)    Lipase (Completed)    Vertigo    Relevant Medications    meclizine (Antivert) 12.5 mg tablet    omeprazole (PriLOSEC) 40 mg DR capsule    Other Relevant Orders    Lipid Panel (Completed)    CBC (Completed)    TSH with reflex to Free T4 if abnormal (Completed)    Hemoglobin A1C (Completed)    Comprehensive Metabolic Panel (Completed)    Amylase (Completed)    Lipase (Completed)    LLQ pain    Current Assessment & Plan     Will get CT scan of the abdomen and pelvis         Relevant Medications    meclizine (Antivert) 12.5 mg tablet    omeprazole (PriLOSEC) 40 mg DR capsule    Other Relevant Orders    Lipid Panel (Completed)    CBC (Completed)    TSH with reflex to Free T4 if abnormal (Completed)    Hemoglobin A1C (Completed)    Comprehensive Metabolic Panel (Completed)    Amylase (Completed)    Lipase (Completed)    Epigastric pain    Current Assessment & Plan     CT scan of the abdomen and pelvis was ordered to evaluate for any pancreatic pathology as patient has not had any response to Tums and does not describe acid reflux pain radiates to through to the back will also check amylase lipase  Antireflux precautions:  Do not lie down for at least 3 to 4 hours after meals.  Raise the head of the bed 4 to 6 inches.  Decrease excess weight.  Avoid citrus juices and other acidic foods, alcohol, chocolate, mints, coffee and other caffeinated beverages, carbonated beverages, fatty and fried foods. Avoid tight-fitting clothing. Avoid cigarettes and other tobacco products.           Relevant Medications    meclizine (Antivert) 12.5 mg tablet    omeprazole (PriLOSEC) 40 mg DR capsule    Other Relevant Orders    CT  "abdomen pelvis w IV contrast    Lipid Panel (Completed)    CBC (Completed)    TSH with reflex to Free T4 if abnormal (Completed)    Hemoglobin A1C (Completed)    Comprehensive Metabolic Panel (Completed)    Amylase (Completed)    Lipase (Completed)    spent 15 minutes obtaining and discussing depression screening using PHQ-2 questions with results documented in chart.”  (If screen positive: \"The screen indicated potential depression and PHQ-9 was obtained with treatment and referral plan discussed  I spent 15minutes face to face with this individual discussing their cardiovascular risk and behavioral therapies of nutritional choices, exercise, and elimination of habits contributing to risk.  We agreed on a plan how they may be able to reduce their current cardiovascular risk.  For patient with risk calculation >10%, Aspirin use was discussed and encouraged unless known allergy or increased risk of bleeding contraindicates use.    Patients 10 year CV risk estimate calculates:    %         "

## 2025-03-09 NOTE — ASSESSMENT & PLAN NOTE
CT scan of the abdomen and pelvis was ordered to evaluate for any pancreatic pathology as patient has not had any response to Tums and does not describe acid reflux pain radiates to through to the back will also check amylase lipase  Antireflux precautions:  Do not lie down for at least 3 to 4 hours after meals.  Raise the head of the bed 4 to 6 inches.  Decrease excess weight.  Avoid citrus juices and other acidic foods, alcohol, chocolate, mints, coffee and other caffeinated beverages, carbonated beverages, fatty and fried foods. Avoid tight-fitting clothing. Avoid cigarettes and other tobacco products.

## 2025-03-12 ENCOUNTER — HOSPITAL ENCOUNTER (OUTPATIENT)
Dept: RADIOLOGY | Facility: CLINIC | Age: 75
Discharge: HOME | End: 2025-03-12
Payer: MEDICARE

## 2025-03-12 DIAGNOSIS — R10.13 EPIGASTRIC PAIN: ICD-10-CM

## 2025-03-12 PROCEDURE — 2550000001 HC RX 255 CONTRASTS: Performed by: FAMILY MEDICINE

## 2025-03-12 PROCEDURE — 74177 CT ABD & PELVIS W/CONTRAST: CPT

## 2025-03-12 RX ADMIN — IOHEXOL 68 ML: 350 INJECTION, SOLUTION INTRAVENOUS at 11:10

## 2025-03-13 ENCOUNTER — APPOINTMENT (OUTPATIENT)
Dept: PRIMARY CARE | Facility: CLINIC | Age: 75
End: 2025-03-13
Payer: MEDICARE

## 2025-03-19 DIAGNOSIS — K81.0 EMPYEMA OF GALLBLADDER: Primary | ICD-10-CM

## 2025-03-19 DIAGNOSIS — K83.8 PNEUMOBILIA: ICD-10-CM

## 2025-03-19 RX ORDER — AMOXICILLIN AND CLAVULANATE POTASSIUM 875; 125 MG/1; MG/1
875 TABLET, FILM COATED ORAL 2 TIMES DAILY
Qty: 20 TABLET | Refills: 0 | Status: SHIPPED | OUTPATIENT
Start: 2025-03-19 | End: 2025-03-29

## 2025-03-24 ENCOUNTER — APPOINTMENT (OUTPATIENT)
Dept: OPHTHALMOLOGY | Facility: CLINIC | Age: 75
End: 2025-03-24
Payer: MEDICARE

## 2025-03-24 DIAGNOSIS — E07.9 THYROID EYE DISEASE: ICD-10-CM

## 2025-03-24 DIAGNOSIS — H35.373 EPIRETINAL MEMBRANE (ERM) OF BOTH EYES: Primary | ICD-10-CM

## 2025-03-24 DIAGNOSIS — H44.23 DEGENERATIVE MYOPIA OF BOTH EYES, UNSPECIFIED WHETHER COMPLICATION PRESENT: ICD-10-CM

## 2025-03-24 DIAGNOSIS — H57.89 THYROID EYE DISEASE: ICD-10-CM

## 2025-03-24 DIAGNOSIS — H35.342 LAMELLAR MACULAR HOLE OF LEFT EYE: ICD-10-CM

## 2025-03-24 PROCEDURE — 99213 OFFICE O/P EST LOW 20 MIN: CPT | Performed by: OPHTHALMOLOGY

## 2025-03-24 PROCEDURE — 92134 CPTRZ OPH DX IMG PST SGM RTA: CPT | Performed by: OPHTHALMOLOGY

## 2025-03-24 ASSESSMENT — VISUAL ACUITY
OS_SC+: +1
OD_SC: 20/25
METHOD: SNELLEN - LINEAR
OS_CC: 20/25
CORRECTION_TYPE: GLASSES
OS_CC+: -2
OS_SC: 20/40
OD_SC+: +2

## 2025-03-24 ASSESSMENT — ENCOUNTER SYMPTOMS: EYES NEGATIVE: 1

## 2025-03-24 ASSESSMENT — CUP TO DISC RATIO
OD_RATIO: .3
OS_RATIO: .3

## 2025-03-24 ASSESSMENT — TONOMETRY
OS_IOP_MMHG: 13
IOP_METHOD: GOLDMANN APPLANATION
OD_IOP_MMHG: 13

## 2025-03-24 ASSESSMENT — EXTERNAL EXAM - LEFT EYE: OS_EXAM: NORMAL

## 2025-03-24 ASSESSMENT — EXTERNAL EXAM - RIGHT EYE: OD_EXAM: NORMAL

## 2025-03-24 NOTE — PROGRESS NOTES
Assessment/Plan   Diagnoses and all orders for this visit:  Epiretinal membrane (ERM) of both eyes  -     OCT, Retina - OU - Both Eyes  Thyroid eye disease  Lamellar macular hole of left eye  Degenerative myopia of both eyes, unspecified whether complication present             Impression          1 H35.373 Epiretinal membrane (erm) of both eyes-Worsening     2 H35.342 Lamellar macular hole of left eye-New     3 H25.811 Combined form of age-related cataract, right eye-Worsening     4 H25.812 Combined form of age-related cataract, left eye-Worsening     5 H04.203 Bilateral epiphora-Stable     6 H50.89 Restrictive strabismus-Improving     7 H50.22 Hypotropia of left eye-Improving     8 E05.00 Thyroid eye disease-Improving     9 H52.10 Myopia-Stable     10 H52.209 Astigmatism-Stable     11 H52.4 Presbyopia-Stable                         Discussion      lamellar hole OS that is not needing any intervention created by:Isaac Smith               Plan          Hi quality OCT  scans obtained     signal good          OCT OD - Normal Foveal Contour, No Edema, IS/OS Junction Normal     OCT OS - Normal Foveal Contour, No Edema, IS/OS [place] Normal          additional commnents:myopic       Fu 1yr

## 2025-04-10 ENCOUNTER — OFFICE VISIT (OUTPATIENT)
Dept: SURGERY | Facility: CLINIC | Age: 75
End: 2025-04-10
Payer: MEDICARE

## 2025-04-10 VITALS
BODY MASS INDEX: 26.33 KG/M2 | HEIGHT: 65 IN | DIASTOLIC BLOOD PRESSURE: 75 MMHG | SYSTOLIC BLOOD PRESSURE: 134 MMHG | WEIGHT: 158 LBS | HEART RATE: 75 BPM

## 2025-04-10 DIAGNOSIS — K83.8 PNEUMOBILIA: ICD-10-CM

## 2025-04-10 PROCEDURE — 1126F AMNT PAIN NOTED NONE PRSNT: CPT | Performed by: SURGERY

## 2025-04-10 PROCEDURE — 1159F MED LIST DOCD IN RCRD: CPT | Performed by: SURGERY

## 2025-04-10 PROCEDURE — 3078F DIAST BP <80 MM HG: CPT | Performed by: SURGERY

## 2025-04-10 PROCEDURE — 1123F ACP DISCUSS/DSCN MKR DOCD: CPT | Performed by: SURGERY

## 2025-04-10 PROCEDURE — 99203 OFFICE O/P NEW LOW 30 MIN: CPT | Performed by: SURGERY

## 2025-04-10 PROCEDURE — 99213 OFFICE O/P EST LOW 20 MIN: CPT | Performed by: SURGERY

## 2025-04-10 PROCEDURE — 1036F TOBACCO NON-USER: CPT | Performed by: SURGERY

## 2025-04-10 PROCEDURE — 3075F SYST BP GE 130 - 139MM HG: CPT | Performed by: SURGERY

## 2025-04-10 ASSESSMENT — ENCOUNTER SYMPTOMS
DEPRESSION: 0
OCCASIONAL FEELINGS OF UNSTEADINESS: 0
LOSS OF SENSATION IN FEET: 0

## 2025-04-10 ASSESSMENT — PAIN SCALES - GENERAL: PAINLEVEL_OUTOF10: 0-NO PAIN

## 2025-04-10 NOTE — LETTER
April 10, 2025     Edy Whatley MD  [address]  [facility] [address]  [facility] [address]    Patient: Jaz Marino   YOB: 1950   Date of Visit: 4/10/2025       Dear Dr. Edy Whatley MD:    Thank you for referring Jaz Marino to me for evaluation. Below are my notes for this consultation.  If you have questions, please do not hesitate to call me. I look forward to following your patient along with you.       Sincerely,     Mau Huynh MD      CC: No Recipients  ______________________________________________________________________________________    Subjective   Patient ID: Jaz Marino is a 74 y.o. female who presents for No chief complaint on file..  HPI  74-year-old female with history of peptic ulcer disease who is referred for evaluation and treatment of a recent CT scan showing some pneumobilia.      Patient states that around the beginning of the year her  had a prolonged hospitalization and ultimately was discharged to rehab facility.  During this time she was particularly stressed and developed some epigastric abdominal pain.  Denied n/v.  This is similar to pains that she had 15 years ago when she was found to have a bleeding ulcer that was treated with transfusion and the EGD.    As part of her workup for her recent epigastric abdominal pain she was sent for CT scan abdomen and pelvis.  This study showed some pneumobilia and a bubble of air in the gallbladder.    Patient was prescribed omeprazole and with this she feels much better.  She the pain her abdominal pain has resolved.  In fact, she is 100% back to her baseline.      Review of Systems  On review of systems patient denies any weight loss, fever, change in bowel habits, melena, hematochezia, coronary artery disease, hypertension, TIA/CVA, bleeding, jaundice, pancreatitis, hepatitis.    Patient does not smoke. Patient does not drink alcohol.    Past Medical History:   Diagnosis Date   • Acute gastric ulcer with  hemorrhage     Acute gastric ulcer with hemorrhage   • Anxiety    • Cataract    • Chest pain 08/02/2023   • Cough 08/02/2023   • Disease of thyroid gland    • Frozen shoulder 08/02/2023   • Left shoulder pain 08/02/2023   • Other conditions influencing health status     Skin Cancer   • PONV (postoperative nausea and vomiting)         Past Surgical History:   Procedure Laterality Date   • BLEPHAROPLASTY Left    • CATARACT EXTRACTION W/  INTRAOCULAR LENS IMPLANT Left 05/30/2024    Dr. Vigil   • CATARACT EXTRACTION W/  INTRAOCULAR LENS IMPLANT Right 06/27/2024    Dr. Vigil   • EYE SURGERY     • HEMORRHOID SURGERY  04/20/2013    Hemorrhoidectomy   • SKIN CANCER EXCISION  04/20/2013    Mohs Micrographic Surgery Face            Objective   === 03/12/25 ===    CT ABDOMEN PELVIS W IV CONTRAST    - Impression -  Colonic diverticulosis without acute diverticulitis.    Normal appendix. No bowel obstruction.    Small amount of pneumobilia. No significant intrahepatic or  extrahepatic biliary dilation. No calcified gallstones. No  pericholecystic inflammation.     Lab Results   Component Value Date    GLUCOSE 97 03/04/2025     03/04/2025    K 3.7 03/04/2025     03/04/2025    CO2 25 03/04/2025    ANIONGAP 11 03/04/2025    BUN 18 03/04/2025    CREATININE 0.71 03/04/2025    EGFR 88 03/04/2025    CALCIUM 9.0 03/04/2025    ALBUMIN 4.4 03/04/2025    ALKPHOS 76 03/04/2025    PROT 7.1 03/04/2025    AST 18 03/04/2025    BILITOT 0.5 03/04/2025    ALT 17 03/04/2025       Physical Exam    Well-nourished, well-developed. In no distress  Alert and oriented x 3  Lungs are clear to auscultation bilaterally.  Cardiac exam is regular rhythm and rate.  Abdomen is soft nontender nondistended.  Neurologic exam is without focal deficit.   Assessment/Plan       Impression:    74-year-old female with history of bleeding peptic ulcer treated with blood products and EGD roughly 15 years ago.    Recently presented with some epigastric  abdominal pain that she thought was related to stress.  CT scan was obtained that shows some pneumobilia and air within the gallbladder.    After being prescribed PPIs she is entirely asymptomatic at this time.    Differential diagnosis of her pneumobilia includes:  instrumentation or surgery such as ERCP or sphincterotomy.  She reports what sounds to be a EGD but no ERCP.    Incompetent sphincter of Oddi    Less likely would be infection with a gas-forming organism that would cause emphysematous cholecystitis.  She is asymptomatic so this is less likely to be the cause.    Regardless she is entirely asymptomatic and has a benign exam.   Suspect findings are nonpathologic and likely  related to previous peptic ulcer      I did recommend that she get into see gastroenterologist for consideration of a an upper endoscopy    I did review her CT with radiologist who suggested a follow up MRCP (req placed)

## 2025-04-10 NOTE — PROGRESS NOTES
Subjective   Patient ID: Jaz Marino is a 74 y.o. female who presents for No chief complaint on file..  HPI  74-year-old female with history of peptic ulcer disease who is referred for evaluation and treatment of a recent CT scan showing some pneumobilia.      Patient states that around the beginning of the year her  had a prolonged hospitalization and ultimately was discharged to rehab facility.  During this time she was particularly stressed and developed some epigastric abdominal pain.  Denied n/v.  This is similar to pains that she had 15 years ago when she was found to have a bleeding ulcer that was treated with transfusion and the EGD.    As part of her workup for her recent epigastric abdominal pain she was sent for CT scan abdomen and pelvis.  This study showed some pneumobilia and a bubble of air in the gallbladder.    Patient was prescribed omeprazole and with this she feels much better.  She the pain her abdominal pain has resolved.  In fact, she is 100% back to her baseline.      Review of Systems  On review of systems patient denies any weight loss, fever, change in bowel habits, melena, hematochezia, coronary artery disease, hypertension, TIA/CVA, bleeding, jaundice, pancreatitis, hepatitis.    Patient does not smoke. Patient does not drink alcohol.    Past Medical History:   Diagnosis Date    Acute gastric ulcer with hemorrhage     Acute gastric ulcer with hemorrhage    Anxiety     Cataract     Chest pain 08/02/2023    Cough 08/02/2023    Disease of thyroid gland     Frozen shoulder 08/02/2023    Left shoulder pain 08/02/2023    Other conditions influencing health status     Skin Cancer    PONV (postoperative nausea and vomiting)         Past Surgical History:   Procedure Laterality Date    BLEPHAROPLASTY Left     CATARACT EXTRACTION W/  INTRAOCULAR LENS IMPLANT Left 05/30/2024    Dr. Vigil    CATARACT EXTRACTION W/  INTRAOCULAR LENS IMPLANT Right 06/27/2024    Dr. Vigil    EYE SURGERY       HEMORRHOID SURGERY  04/20/2013    Hemorrhoidectomy    SKIN CANCER EXCISION  04/20/2013    Mohs Micrographic Surgery Face            Objective   === 03/12/25 ===    CT ABDOMEN PELVIS W IV CONTRAST    - Impression -  Colonic diverticulosis without acute diverticulitis.    Normal appendix. No bowel obstruction.    Small amount of pneumobilia. No significant intrahepatic or  extrahepatic biliary dilation. No calcified gallstones. No  pericholecystic inflammation.     Lab Results   Component Value Date    GLUCOSE 97 03/04/2025     03/04/2025    K 3.7 03/04/2025     03/04/2025    CO2 25 03/04/2025    ANIONGAP 11 03/04/2025    BUN 18 03/04/2025    CREATININE 0.71 03/04/2025    EGFR 88 03/04/2025    CALCIUM 9.0 03/04/2025    ALBUMIN 4.4 03/04/2025    ALKPHOS 76 03/04/2025    PROT 7.1 03/04/2025    AST 18 03/04/2025    BILITOT 0.5 03/04/2025    ALT 17 03/04/2025       Physical Exam    Well-nourished, well-developed. In no distress  Alert and oriented x 3  Lungs are clear to auscultation bilaterally.  Cardiac exam is regular rhythm and rate.  Abdomen is soft nontender nondistended.  Neurologic exam is without focal deficit.   Assessment/Plan       Impression:    74-year-old female with history of bleeding peptic ulcer treated with blood products and EGD roughly 15 years ago.    Recently presented with some epigastric abdominal pain that she thought was related to stress.  CT scan was obtained that shows some pneumobilia and air within the gallbladder.    After being prescribed PPIs she is entirely asymptomatic at this time.    Differential diagnosis of her pneumobilia includes:  instrumentation or surgery such as ERCP or sphincterotomy.  She reports what sounds to be a EGD but no ERCP.    Incompetent sphincter of Oddi    Less likely would be infection with a gas-forming organism that would cause emphysematous cholecystitis.  She is asymptomatic so this is less likely to be the cause.    Regardless she is  entirely asymptomatic and has a benign exam.   Suspect findings are nonpathologic and likely  related to previous peptic ulcer      I did recommend that she get into see gastroenterologist for consideration of a an upper endoscopy    I did review her CT with radiologist who suggested a follow up MRCP (req placed)

## 2025-04-21 DIAGNOSIS — I10 PRIMARY HYPERTENSION: ICD-10-CM

## 2025-04-21 RX ORDER — LOSARTAN POTASSIUM AND HYDROCHLOROTHIAZIDE 25; 100 MG/1; MG/1
1 TABLET ORAL DAILY
Qty: 100 TABLET | Refills: 0 | Status: SHIPPED | OUTPATIENT
Start: 2025-04-21

## 2025-05-11 DIAGNOSIS — E78.5 HYPERLIPIDEMIA, UNSPECIFIED HYPERLIPIDEMIA TYPE: ICD-10-CM

## 2025-05-11 RX ORDER — ATORVASTATIN CALCIUM 40 MG/1
40 TABLET, FILM COATED ORAL DAILY
Qty: 100 TABLET | Refills: 2 | Status: SHIPPED | OUTPATIENT
Start: 2025-05-11

## 2025-05-12 ENCOUNTER — TELEPHONE (OUTPATIENT)
Dept: PRIMARY CARE | Facility: CLINIC | Age: 75
End: 2025-05-12
Payer: MEDICARE

## 2025-05-13 ENCOUNTER — HOSPITAL ENCOUNTER (OUTPATIENT)
Dept: RADIOLOGY | Facility: HOSPITAL | Age: 75
End: 2025-05-13
Payer: MEDICARE

## 2025-05-15 ENCOUNTER — HOSPITAL ENCOUNTER (OUTPATIENT)
Dept: RADIOLOGY | Facility: CLINIC | Age: 75
Discharge: HOME | End: 2025-05-15
Payer: MEDICARE

## 2025-05-15 DIAGNOSIS — K83.8 PNEUMOBILIA: ICD-10-CM

## 2025-05-15 PROCEDURE — A9575 INJ GADOTERATE MEGLUMI 0.1ML: HCPCS | Mod: JZ | Performed by: SURGERY

## 2025-05-15 PROCEDURE — 74183 MRI ABD W/O CNTR FLWD CNTR: CPT

## 2025-05-15 PROCEDURE — 2550000001 HC RX 255 CONTRASTS: Mod: JZ | Performed by: SURGERY

## 2025-05-15 RX ORDER — GADOTERATE MEGLUMINE 376.9 MG/ML
14 INJECTION INTRAVENOUS
Status: COMPLETED | OUTPATIENT
Start: 2025-05-15 | End: 2025-05-15

## 2025-05-15 RX ADMIN — GADOTERATE MEGLUMINE 14 ML: 376.9 INJECTION INTRAVENOUS at 11:04

## 2025-05-27 ENCOUNTER — TELEPHONE (OUTPATIENT)
Dept: SURGERY | Facility: HOSPITAL | Age: 75
End: 2025-05-27
Payer: MEDICARE

## 2025-05-27 DIAGNOSIS — R93.5 ABNORMAL MRI OF ABDOMEN: Primary | ICD-10-CM

## 2025-05-27 NOTE — TELEPHONE ENCOUNTER
I called Jaz to review the results of the MRI.  This study showed that the pneumobilia had resolved but this study does show small subcentimeter pancreatic cyst which may represent IPMN.  I did refer her to surgical oncology for further evaluation and treatment and gave her the name of Dr. Ashish Vazquez

## 2025-05-29 DIAGNOSIS — E78.5 HYPERLIPIDEMIA, UNSPECIFIED HYPERLIPIDEMIA TYPE: ICD-10-CM

## 2025-05-29 RX ORDER — ATORVASTATIN CALCIUM 40 MG/1
40 TABLET, FILM COATED ORAL DAILY
Qty: 100 TABLET | Refills: 2 | Status: SHIPPED | OUTPATIENT
Start: 2025-05-29

## 2025-06-09 NOTE — PROGRESS NOTES
Chief Complaint:   Pancreatic Cyst    Ref = JJ    HPI:  74 yo woman from [place] referred for pancreatic cyst.  Pt recently seen by Dr Huynh for incidental finding of pneumobilia on CT....please see his note.      CT March 12:  IMPRESSION:  Colonic diverticulosis without acute diverticulitis.   Normal appendix. No bowel obstruction.   Small amount of pneumobilia. No significant intrahepatic or  extrahepatic biliary dilation. No calcified gallstones. No  pericholecystic inflammation.    Dr Huynh reviewed CT with rads who rec MRI    MRI May 2025:  IMPRESSION:  1. No pneumobilia is evident. There is no intrahepatic or  extrahepatic biliary dilatation.  2. Subcentimeter cystic lesion in the pancreatic tail which may  represent a branch duct IPMN. No pancreatic ductal dilatation, soft  tissue nodularity, or abnormal enhancement.    Referred to me for pancreatic cyst.    No history of pancreatitis.  No symptoms    PMH:  HTN, HLD, Graves, osteopenia, BCCa    PSH:  skin CA, eyes, hemorrhoid    Soc:  .  Here with .    PE: Well-appearing.  Not jaundiced.    Impression / Plan:    This patient has a completely incidentally found 3 mm cyst in her pancreatic tail.  There is nothing remotely worrisome or high risk.  This could be a dilated sidebranch or a small branch duct IPMN.  In either case we will simply do an MRI in a years time.  The patient will call my office and my nurse partner will set up the MRI and office visit.    This note has been dictated with voice recognition software and has not been reviewed for grammar or content errors.

## 2025-06-11 ENCOUNTER — APPOINTMENT (OUTPATIENT)
Dept: SURGERY | Facility: CLINIC | Age: 75
End: 2025-06-11
Payer: MEDICARE

## 2025-06-11 VITALS
HEIGHT: 65 IN | HEART RATE: 70 BPM | BODY MASS INDEX: 26.46 KG/M2 | DIASTOLIC BLOOD PRESSURE: 71 MMHG | SYSTOLIC BLOOD PRESSURE: 126 MMHG | WEIGHT: 158.8 LBS | OXYGEN SATURATION: 97 % | TEMPERATURE: 97.5 F

## 2025-06-11 DIAGNOSIS — R93.5 ABNORMAL MRI OF ABDOMEN: ICD-10-CM

## 2025-06-11 PROCEDURE — 3078F DIAST BP <80 MM HG: CPT | Performed by: SURGERY

## 2025-06-11 PROCEDURE — 3074F SYST BP LT 130 MM HG: CPT | Performed by: SURGERY

## 2025-06-11 PROCEDURE — 1159F MED LIST DOCD IN RCRD: CPT | Performed by: SURGERY

## 2025-06-11 PROCEDURE — 99202 OFFICE O/P NEW SF 15 MIN: CPT | Performed by: SURGERY

## 2025-06-12 DIAGNOSIS — I10 HYPERTENSION, UNSPECIFIED TYPE: ICD-10-CM

## 2025-06-12 RX ORDER — AMLODIPINE BESYLATE 10 MG/1
10 TABLET ORAL DAILY
Qty: 100 TABLET | Refills: 2 | Status: SHIPPED | OUTPATIENT
Start: 2025-06-12

## 2025-07-08 ENCOUNTER — APPOINTMENT (OUTPATIENT)
Dept: GASTROENTEROLOGY | Facility: CLINIC | Age: 75
End: 2025-07-08
Payer: MEDICARE

## 2025-07-11 DIAGNOSIS — I10 HYPERTENSION, UNSPECIFIED TYPE: ICD-10-CM

## 2025-07-11 RX ORDER — AMLODIPINE BESYLATE 10 MG/1
10 TABLET ORAL DAILY
Qty: 100 TABLET | Refills: 2 | Status: SHIPPED | OUTPATIENT
Start: 2025-07-11

## 2025-07-29 ENCOUNTER — APPOINTMENT (OUTPATIENT)
Dept: PRIMARY CARE | Facility: CLINIC | Age: 75
End: 2025-07-29
Payer: MEDICARE

## 2025-07-29 VITALS
BODY MASS INDEX: 26.33 KG/M2 | SYSTOLIC BLOOD PRESSURE: 130 MMHG | OXYGEN SATURATION: 96 % | HEIGHT: 65 IN | WEIGHT: 158 LBS | DIASTOLIC BLOOD PRESSURE: 69 MMHG | HEART RATE: 65 BPM

## 2025-07-29 DIAGNOSIS — I10 BENIGN ESSENTIAL HYPERTENSION: ICD-10-CM

## 2025-07-29 DIAGNOSIS — F41.1 GENERALIZED ANXIETY DISORDER: ICD-10-CM

## 2025-07-29 DIAGNOSIS — Z00.00 ROUTINE GENERAL MEDICAL EXAMINATION AT A HEALTH CARE FACILITY: Primary | ICD-10-CM

## 2025-07-29 DIAGNOSIS — E78.00 PURE HYPERCHOLESTEROLEMIA: ICD-10-CM

## 2025-07-29 RX ORDER — LORAZEPAM 0.5 MG/1
0.5 TABLET ORAL EVERY 6 HOURS PRN
Qty: 28 TABLET | Refills: 0 | Status: SHIPPED | OUTPATIENT
Start: 2025-07-29 | End: 2025-08-05

## 2025-08-02 NOTE — PROGRESS NOTES
"  Subjective     Patient ID: Jaz Marino is a 75 y.o. female who presents for Med Refill.      Last Physical : 1 Years ago     Pt's PMH, PSH, SH, FH , meds and allergies was obtained / reviewed and updated .     Dental visits : Y  Vision issues : N  Hearing issues : N    Immunizations : Y    Diet :  could be better  Exercise:  Weight concerns :     Alcohol: as noted in SH  Tobacco: as noted in SH  Recreational drug use : None/ as noted in SH    Sexually active : Active   Contraception :   Menstrual problems:  Premenopausal/perimenopausal/ postmenopausal:    G:  Parity:  Full term:    Premature:   (s):   Living :  Ab induced:   Ab spontaneous :  Ectopic :   Multiple :    PAP smear :  Mammogram :  Colonoscopy:    Metabolic screening   - Lipid   - Glucose    Patient is here for follow-up of hypertension hyperlipidemia has been taking meds as prescribed denies chest pain shortness of breath current allergies myalgias dizziness been eating healthy and trying to increase exercise  Anxiety has been worse at times taking lorazepam very occasionally  ======================================================    Visit Vitals  /69   Pulse 65   Ht 1.651 m (5' 5\")   Wt 71.7 kg (158 lb)   SpO2 96%   BMI 26.29 kg/m²   OB Status Postmenopausal   Smoking Status Never   BSA 1.81 m²      No LMP recorded. Patient is postmenopausal.   Here for follow-up of hypertension hyperlipidemia anxiety   Hospital Beds:  1. The beneficiary has a medical condition which requires positioning of the body in ways not feasible with an ordinary bed.   Elevation of the head/ upper body less than 30 degrees does not usually require the use of a hospital bed    2. The beneficiary requires positioning of the body in ways not feasible with the ordinary bed in order to alleviate pain    3. The beneficiary requires the head of the bed to be elevates more than 30 degrees most of the time due to congestive heart failure, chronic pulmonary " disease, or problems with aspiration    4. The beneficiary requires traction equipment, which can only be attached to a hospital bed       =====================================    Review of systems:  Constitutional: no chills, no fever and no night sweats.     Eyes: no blurred vision and no eyesight problems.     ENT: no hearing loss, no nasal congestion, no nasal discharge, no hoarseness and no sore throat.     Cardiovascular: no chest pain, no intermittent leg claudication, no lower extremity edema, no palpitations and no syncope.     Respiratory: no cough, no shortness of breath during exertion, no shortness of breath at rest and no wheezing.     Gastrointestinal: no abdominal pain, no blood in stools, no constipation, no diarrhea, no melena, no nausea, no rectal pain and no vomiting.     Genitourinary: no dysuria, no change in urinary frequency, no urinary hesitancy, no feelings of urinary urgency and no vaginal discharge.     Musculoskeletal: no arthralgias, no back pain and no myalgias.     Integumentary: no new skin lesions and no rashes.     Neurological: no difficulty walking, no headache, no limb weakness, no numbness and no tingling.     Psychiatric: no anxiety, no depression, no anhedonia and no substance use disorders.     Endocrine: no recent weight gain and no recent weight loss.     Hematologic/Lymphatic: no tendency for easy bruising and no swollen glands.   ============================================================    Physical exam :    Constitutional: Alert and in no acute distress. Well developed, well nourished.     Eyes: Normal external exam. Pupils were equal in size, round, reactive to light (PERRL) with normal accommodation and extraocular movements intact (EOMI).     Ears, Nose, Mouth, and Throat: External inspection of ears and nose: Normal.  Otoscopic examination: Normal.      Neck: No neck mass was observed. Supple.     Cardiovascular: Heart rate and rhythm were normal, normal S1 and  S2, no gallops, no murmurs and no pericardial rub    Pulmonary: No respiratory distress. Clear bilateral breath sounds.     Abdomen: Soft nontender; no abdominal mass palpated. No organomegaly.     Musculoskeletal: No joint swelling seen, normal movements of all extremities. Range of motion: Normal.  Muscle strength/tone: Normal.      Skin: Normal skin color and pigmentation, normal skin turgor, and no rash.     Neurologic: Deep tendon reflexes were 2+ and symmetric. Sensation: Normal.     Psychiatric: Judgment and insight: Intact. Mood and affect: Normal.    Lymphatic : Cervical/ axillary/ groin Lns Palpable/ non palpable            All other systems have been reviewed and are negative for complaint.      Assessment/Plan    Problem List Items Addressed This Visit           ICD-10-CM    Benign essential hypertension I10    What is High Blood Pressure? High blood pressure (also called hypertension) is when your blood moves through your arteries at a higher pressure than normal and that forces your heart to work harder against the high pressure    What are the Complications of High Blood Pressure? When your blood pressure gets too high or stays high for a long time, it can cause health problems such as:    Kidney disease or kidney failure   Heart attack and heart failure   Stroke   Vision problems   Circulation problems, poor blood supply to the legs    What are the causes of High Blood Pressure: Family history, excessive weight, lack of physical activity. Stress. Smoking and diet high in sodium and saturated fats can raise Blood Pressure.     What are the Signs of High Blood Pressure? High blood pressure doesn’t usually have warning signs or symptoms, so many people don’t realize they have it and that is why regular monitoring is important.     Prevention and Treatment: Often high blood pressure can be controlled with lifestyle changes and when necessary, medications. Adopting heart healthy habits can help:       Maintain a Healthy Weight   Eat a heart healthy diet full of vegetables, fruits and whole grains that are high in fiber   Be Physically Active every day   Find heart healthy ways to reduce stress and increase relaxation    Don’t use tobacco products   Limit your intake of alcohol, caffeine and salt Salt to less than 2500mg /day.    Monitor your blood pressure regularly:  Target BP for most patients is 120/80. Up to 130/85 is acceptable. BP above at 140/90 or above is considered too high   Take your medications as prescribed, even when you’re feeling well            Hyperlipidemia E78.5    Lifestyle changes for elevated cholesterol levels include diet and exercise. For exercise, we recommend at least 150 mins of moderate intensity exercise in a week (enough that your heart rate picks up and you are sweating). For diet, the mediterranean diet has been shown to help. General recommendations beyond that include having less red meat more chicken/turkey/fish, less butter more olive oil, and less fried food more fiber and vegetables.           Routine general medical examination at a health care facility - Primary Z00.00    Generalized anxiety disorder F41.1    Relevant Medications    LORazepam (Ativan) 0.5 mg tablet

## 2025-08-17 DIAGNOSIS — I10 PRIMARY HYPERTENSION: ICD-10-CM

## 2025-08-17 RX ORDER — LOSARTAN POTASSIUM AND HYDROCHLOROTHIAZIDE 25; 100 MG/1; MG/1
1 TABLET ORAL DAILY
Qty: 100 TABLET | Refills: 0 | Status: SHIPPED | OUTPATIENT
Start: 2025-08-17

## 2025-09-08 ENCOUNTER — APPOINTMENT (OUTPATIENT)
Dept: OPHTHALMOLOGY | Facility: CLINIC | Age: 75
End: 2025-09-08
Payer: MEDICARE

## 2025-09-12 ENCOUNTER — APPOINTMENT (OUTPATIENT)
Dept: ENDOCRINOLOGY | Facility: CLINIC | Age: 75
End: 2025-09-12
Payer: MEDICARE

## 2025-12-23 ENCOUNTER — APPOINTMENT (OUTPATIENT)
Dept: PRIMARY CARE | Facility: CLINIC | Age: 75
End: 2025-12-23
Payer: MEDICARE

## 2025-12-30 ENCOUNTER — APPOINTMENT (OUTPATIENT)
Dept: PRIMARY CARE | Facility: CLINIC | Age: 75
End: 2025-12-30
Payer: MEDICARE

## 2026-04-13 ENCOUNTER — APPOINTMENT (OUTPATIENT)
Dept: OPHTHALMOLOGY | Age: 76
End: 2026-04-13
Payer: MEDICARE

## (undated) DEVICE — APPLICATOR, COTTON TIP, 6 IN, 2PK, STERILE

## (undated) DEVICE — SOLUTION, OPHTHALMIC, TETRACAINE HCL 0.5%, 2 ML, VIAL